# Patient Record
Sex: FEMALE | Race: WHITE | NOT HISPANIC OR LATINO | Employment: UNEMPLOYED | URBAN - METROPOLITAN AREA
[De-identification: names, ages, dates, MRNs, and addresses within clinical notes are randomized per-mention and may not be internally consistent; named-entity substitution may affect disease eponyms.]

---

## 2024-08-29 DIAGNOSIS — M77.42 METATARSALGIA OF LEFT FOOT: ICD-10-CM

## 2024-08-29 RX ORDER — MELOXICAM 15 MG/1
15 TABLET ORAL DAILY
Qty: 30 TABLET | Refills: 0 | Status: SHIPPED | OUTPATIENT
Start: 2024-08-29

## 2024-08-30 ENCOUNTER — TELEPHONE (OUTPATIENT)
Dept: OBGYN CLINIC | Facility: CLINIC | Age: 49
End: 2024-08-30

## 2024-08-30 ENCOUNTER — OFFICE VISIT (OUTPATIENT)
Age: 49
End: 2024-08-30
Payer: COMMERCIAL

## 2024-08-30 VITALS
HEART RATE: 62 BPM | BODY MASS INDEX: 28.34 KG/M2 | SYSTOLIC BLOOD PRESSURE: 158 MMHG | HEIGHT: 68 IN | DIASTOLIC BLOOD PRESSURE: 97 MMHG | WEIGHT: 187 LBS

## 2024-08-30 DIAGNOSIS — M77.42 METATARSALGIA, LEFT FOOT: ICD-10-CM

## 2024-08-30 DIAGNOSIS — M20.32 HALLUX HAMMERTOE, LEFT: Primary | ICD-10-CM

## 2024-08-30 DIAGNOSIS — M21.962 METATARSAL DEFORMITY, LEFT: ICD-10-CM

## 2024-08-30 DIAGNOSIS — M79.672 FOOT PAIN, LEFT: ICD-10-CM

## 2024-08-30 DIAGNOSIS — M20.42 HAMMERTOE OF LEFT FOOT: ICD-10-CM

## 2024-08-30 DIAGNOSIS — Z01.818 PREOPERATIVE CLEARANCE: ICD-10-CM

## 2024-08-30 PROCEDURE — 99213 OFFICE O/P EST LOW 20 MIN: CPT | Performed by: STUDENT IN AN ORGANIZED HEALTH CARE EDUCATION/TRAINING PROGRAM

## 2024-08-30 RX ORDER — CHLORHEXIDINE GLUCONATE ORAL RINSE 1.2 MG/ML
15 SOLUTION DENTAL ONCE
OUTPATIENT
Start: 2024-08-30 | End: 2024-08-30

## 2024-08-30 RX ORDER — CHLORHEXIDINE GLUCONATE 40 MG/ML
SOLUTION TOPICAL DAILY PRN
OUTPATIENT
Start: 2024-08-30

## 2024-09-03 NOTE — PROGRESS NOTES
[facility] Office Visit    ASSESSMENT     Diagnoses and all orders for this visit:    Hallux hammertoe, left  -     Post Op Shoe    Metatarsal deformity, left  -     Case request operating room: Weil osteotomy third metatarsal, REPAIR HAMMERTOE third digit; Standing  -     Ambulatory referral to Family Practice; Future  -     Cancel: Basic metabolic panel; Future  -     Comprehensive metabolic panel; Future  -     Case request operating room: Weil osteotomy third metatarsal, REPAIR HAMMERTOE third digit    Foot pain, left  -     Case request operating room: Weil osteotomy third metatarsal, REPAIR HAMMERTOE third digit; Standing  -     Case request operating room: Weil osteotomy third metatarsal, REPAIR HAMMERTOE third digit    Metatarsalgia, left foot  -     Case request operating room: Weil osteotomy third metatarsal, REPAIR HAMMERTOE third digit; Standing  -     Ambulatory referral to Family Practice; Future  -     Cancel: Basic metabolic panel; Future  -     Comprehensive metabolic panel; Future  -     Case request operating room: Weil osteotomy third metatarsal, REPAIR HAMMERTOE third digit    Hammertoe of left foot  -     Case request operating room: Weil osteotomy third metatarsal, REPAIR HAMMERTOE third digit; Standing  -     Ambulatory referral to Family Practice; Future  -     Cancel: Basic metabolic panel; Future  -     Comprehensive metabolic panel; Future  -     Case request operating room: Weil osteotomy third metatarsal, REPAIR HAMMERTOE third digit    Preoperative clearance  -     Ambulatory referral to Family Practice; Future  -     Cancel: Basic metabolic panel; Future  -     Comprehensive metabolic panel; Future  -     CBC and differential; Future    Other orders  -     Nursing Communication Warmimg Interventions Implemented; Standing  -     Nursing Communication CHG bath, have staff wash entire body (neck down) per pre-op bathing protocol. Routine, evening prior to,  and day of surgery.; Standing  -     Nursing Communication Swab both nares with Povidone-Iodine solution, EXCLUDE if patient has shellfish/Iodine allergy, and replace with nasal alcohol swabstick. Routine, day of surgery, on call to OR; Standing  -     chlorhexidine (PERIDEX) 0.12 % oral rinse 15 mL  -     Void on call to OR; Standing  -     Insert peripheral IV; Standing  -     Diet NPO; Sips with meds; Standing  -     ceFAZolin (ANCEF) 2,000 mg in sodium chloride 0.9 % 50 mL IVPB  -     chlorhexidine gluconate (HIBICLENS) 4 % topical liquid         Problem List Items Addressed This Visit          Musculoskeletal and Integument    Hallux hammertoe, left - Primary    Relevant Orders    Post Op Shoe     Other Visit Diagnoses       Metatarsal deformity, left        Relevant Orders    Case request operating room: Weil osteotomy third metatarsal, REPAIR HAMMERTOE third digit (Completed)    Ambulatory referral to [facility]    Comprehensive metabolic panel    Foot pain, left        Relevant Orders    Case request operating room: Weil osteotomy third metatarsal, REPAIR HAMMERTOE third digit (Completed)    Metatarsalgia, left foot        Relevant Orders    Case request operating room: Weil osteotomy third metatarsal, REPAIR HAMMERTOE third digit (Completed)    Ambulatory referral to [facility]    Comprehensive metabolic panel    Hammertoe of left foot        Relevant Orders    Case request operating room: Weil osteotomy third metatarsal, REPAIR HAMMERTOE third digit (Completed)    Ambulatory referral to [facility]    Comprehensive metabolic panel    Preoperative clearance        Relevant Orders    Ambulatory referral to [facility]    Comprehensive metabolic panel    CBC and differential          PLAN  -Patient was educated regarding their condition.  -Continue supportive shoes and meloxicam  -After a thorough discussion of continued conservative versus surgical management, Moody would like to pursue  surgical correction of her left foot.  We discussed all risks, possible complications, and alternatives.  -Follow-up PCP clearance, CBC, CMP    Written Consent statement:  I was very clear at the beginning of the discussion about alternatives to this surgery including benign neglect, bracing, and second surgical opinions.   I spent time to discuss with the patient the surgical procedure(s) as Weil osteotomy left third metatarsal, left third digit hammertoe repair, and post-op course required to properly heal the surgery. I discussed risks as infection, scar, swelling, chronic pain, painful or prominent hardware (if used), possible need to remove hardware (if used), poor healing of incision or bone that could require more surgery, incomplete correction of deformities or recurrence of deformities, change in shape of foot, toe, or walking and function, numbness, neuritis/RSD, blood clots in the leg or lung, and even death from anesthesia complications. No guarantees were given and the possibility of recurrent deformity or incomplete correction were discussed before patient signed the consent form. We also discussed the need for possible anticoagulation. The offloading device necessary after the surgery will be a surgical shoe.     SUBJECTIVE    Chief Complaint:  Left foot pain     Patient ID: Moody Garrido     8/2/2024: Moody is a pleasant 48-year-old female who presents today for evaluation of her left foot.  She states that she dropped a laptop on her toe sometime around 7/16/2024.  She states that there is bruising and swelling at the time, she was limping and was put in a boot.  She states that she stopped wearing the boot yesterday.  She states that there is continued pain to her left plantar foot at the ball of the foot.  She denies attempting any other treatment.  She states that there is pain with ambulation which is made better with rest.    8/30/2024: Moody states that despite getting better sneakers and  "using her metatarsal pads she has continued pain to the left foot.  She states that generally is the bottom of her foot hurting but the top of her third digit hurts as well.  She is interested in planning surgical intervention at today's visit.        The following portions of the patient's history were reviewed and updated as appropriate: allergies, current medications, past family history, past medical history, past social history, past surgical history and problem list.    Review of Systems   Constitutional: Negative.    HENT: Negative.     Respiratory: Negative.     Cardiovascular: Negative.    Gastrointestinal: Negative.    Musculoskeletal:  Positive for arthralgias.   Skin: Negative.    Neurological: Negative.          OBJECTIVE      /97   Pulse 62   Ht 5' 8\" (1.727 m)   Wt 84.8 kg (187 lb)   BMI 28.43 kg/m²        Physical Exam  Constitutional:       Appearance: Normal appearance.   HENT:      Head: Normocephalic and atraumatic.   Eyes:      General:         Right eye: No discharge.         Left eye: No discharge.   Cardiovascular:      Rate and Rhythm: Normal rate and regular rhythm.      Pulses:           Dorsalis pedis pulses are 2+ on the right side and 2+ on the left side.        Posterior tibial pulses are 2+ on the right side and 2+ on the left side.   Pulmonary:      Effort: Pulmonary effort is normal.      Breath sounds: Normal breath sounds.   Skin:     General: Skin is warm.      Capillary Refill: Capillary refill takes less than 2 seconds.   Neurological:      Sensory: Sensation is intact. No sensory deficit.         MSK:  -Pain on palpation to left plantar foot, submetatarsal 3  -Pain on palpation to the dorsal aspect of the third proximal interphalangeal joint  -Third digit is noted to be dorsiflexed at the level of the MTPJ, plantarflexed at the level of the PIPJ consistent with a hammertoe deformity.  -No gross deformities noted   -Active range of motion lesser digits intact  -First " MTPJ range of motion within normal limits bilaterally  -Ankle dorsiflexion at least 10 degrees with knee extended, and knee flexed  -Prominent metatarsal heads noted plantarly, especially the 3rd metatarsal    Derm:  -No lesions, abrasions, or open wounds noted  -No noted interdigital maceration, peeling, malodor  -No callus formation noted on exam     Vascular:  -DP and PT pulses intact b/l  -Capillary refill time <2 sec b/l  -Digital hair growth: Present  -Skin temp: WNL

## 2024-09-09 ENCOUNTER — OFFICE VISIT (OUTPATIENT)
Dept: FAMILY MEDICINE CLINIC | Facility: CLINIC | Age: 49
End: 2024-09-09
Payer: COMMERCIAL

## 2024-09-09 VITALS
WEIGHT: 189.4 LBS | TEMPERATURE: 97.5 F | SYSTOLIC BLOOD PRESSURE: 138 MMHG | HEART RATE: 74 BPM | DIASTOLIC BLOOD PRESSURE: 78 MMHG | BODY MASS INDEX: 28.7 KG/M2 | HEIGHT: 68 IN | OXYGEN SATURATION: 100 % | RESPIRATION RATE: 18 BRPM

## 2024-09-09 DIAGNOSIS — R53.83 OTHER FATIGUE: ICD-10-CM

## 2024-09-09 DIAGNOSIS — M48.07 SPINAL STENOSIS OF LUMBOSACRAL REGION: ICD-10-CM

## 2024-09-09 DIAGNOSIS — M46.1 SACROILIITIS, NOT ELSEWHERE CLASSIFIED (HCC): ICD-10-CM

## 2024-09-09 DIAGNOSIS — M96.1 FAILED BACK SYNDROME: ICD-10-CM

## 2024-09-09 DIAGNOSIS — Z76.89 ENCOUNTER TO ESTABLISH CARE: ICD-10-CM

## 2024-09-09 DIAGNOSIS — M77.42 METATARSALGIA, LEFT FOOT: ICD-10-CM

## 2024-09-09 DIAGNOSIS — M79.7 FIBROMYALGIA: Primary | ICD-10-CM

## 2024-09-09 DIAGNOSIS — E66.3 OVERWEIGHT (BMI 25.0-29.9): ICD-10-CM

## 2024-09-09 DIAGNOSIS — Z13.29 SCREENING FOR THYROID DISORDER: ICD-10-CM

## 2024-09-09 DIAGNOSIS — M21.962 METATARSAL DEFORMITY, LEFT: ICD-10-CM

## 2024-09-09 DIAGNOSIS — K59.02 DYSSYNERGIC DEFECATION: ICD-10-CM

## 2024-09-09 DIAGNOSIS — M20.42 HAMMERTOE OF LEFT FOOT: ICD-10-CM

## 2024-09-09 DIAGNOSIS — Z13.1 ENCOUNTER FOR SCREENING FOR DIABETES MELLITUS: ICD-10-CM

## 2024-09-09 DIAGNOSIS — Z13.228 SCREENING FOR METABOLIC DISORDER: ICD-10-CM

## 2024-09-09 DIAGNOSIS — Z13.220 SCREENING FOR LIPID DISORDERS: ICD-10-CM

## 2024-09-09 PROCEDURE — 99204 OFFICE O/P NEW MOD 45 MIN: CPT | Performed by: STUDENT IN AN ORGANIZED HEALTH CARE EDUCATION/TRAINING PROGRAM

## 2024-09-09 NOTE — PROGRESS NOTES
Ambulatory Visit  Name: Moody Garrido      : 1975      MRN: [id number]  Encounter Provider: Katarina Gonsalez MD  Encounter Date: 2024   Encounter department: [facility]    Assessment & Plan   1. Fibromyalgia  -     Ambulatory referral to Spine & Pain Management; Future  2. Metatarsal deformity, left  -     Ambulatory referral to Family Practice  3. Metatarsalgia, left foot  -     Ambulatory referral to Family Practice  4. Hammertoe of left foot  -     Ambulatory referral to Family Practice  5. Failed back syndrome  -     Ambulatory referral to Spine & Pain Management; Future  6. Spinal stenosis of lumbosacral region  -     Ambulatory referral to Spine & Pain Management; Future  7. Sacroiliitis, not elsewhere classified (HCC)  8. Dyssynergic defecation  -     Ambulatory Referral to Physical Therapy; Future  9. Screening for thyroid disorder  -     TSH, 3rd generation with Free T4 reflex; Future  10. Overweight (BMI 25.0-29.9)  -     Lipid Panel with Direct LDL reflex; Future; Expected date: 2024  -     Hemoglobin A1C; Future  -     Comprehensive metabolic panel; Future  11. Screening for lipid disorders  -     Lipid Panel with Direct LDL reflex; Future; Expected date: 2024  12. Screening for metabolic disorder  -     Hemoglobin A1C; Future  13. Other fatigue  -     CBC and differential; Future  -     Comprehensive metabolic panel; Future  14. Encounter for screening for diabetes mellitus  -     Hemoglobin A1C; Future  15. Encounter to establish care       History of Present Illness     HPI    Patient presents to establish care  Patient dropped a laptop on her left foot about two weeks ago. She saw podiatry and is scheduled for surgery of metatarsal deformity in two weeks.   She has a history of multiple back pain issues along with fibromyalgia. She saw pain management for this in [place]. She notes because she was dealing with her divorce she abruptly stopped  "all pain medications on her own. She was on opiates in the past.   She moved from [place]. Smokes medical marijuana.   Drinks socially  Constipation from dyssynergic syndrome diagnosed 4-5 years ago  No children or pregnancies  Both parents were diabetic  Paternal aunt breast cancer  There is colon and pancreatic cancer on father's side    Review of Systems   Constitutional:  Negative for activity change, chills, diaphoresis, fatigue and fever.   HENT:  Negative for congestion, postnasal drip, rhinorrhea and sore throat.    Respiratory:  Negative for cough, shortness of breath and wheezing.    Cardiovascular:  Negative for chest pain, palpitations and leg swelling.   Gastrointestinal:  Positive for constipation. Negative for abdominal pain, diarrhea, nausea and vomiting.   Musculoskeletal:  Positive for arthralgias, back pain and myalgias.   Skin:  Negative for rash.   Neurological:  Negative for weakness, light-headedness and headaches.   Psychiatric/Behavioral:  The patient is not nervous/anxious.        Objective     /78   Pulse 74   Temp 97.5 °F (36.4 °C) (Temporal)   Resp 18   Ht 5' 8\" (1.727 m)   Wt 85.9 kg (189 lb 6.4 oz)   LMP 08/15/2023 (Approximate) Comment: post menapausal  SpO2 100%   BMI 28.80 kg/m²     Physical Exam  Constitutional:       Appearance: Normal appearance.   HENT:      Head: Normocephalic and atraumatic.   Cardiovascular:      Rate and Rhythm: Normal rate and regular rhythm.      Pulses: Normal pulses.      Heart sounds: Normal heart sounds.   Neurological:      General: No focal deficit present.      Mental Status: She is alert and oriented to person, place, and time.   Psychiatric:         Mood and Affect: Mood normal.         Behavior: Behavior normal.         Thought Content: Thought content normal.         Judgment: Judgment normal.       Administrative Statements           "

## 2024-09-10 ENCOUNTER — APPOINTMENT (OUTPATIENT)
Dept: LAB | Facility: CLINIC | Age: 49
End: 2024-09-10
Payer: COMMERCIAL

## 2024-09-10 DIAGNOSIS — E66.3 OVERWEIGHT (BMI 25.0-29.9): ICD-10-CM

## 2024-09-10 DIAGNOSIS — R53.83 OTHER FATIGUE: ICD-10-CM

## 2024-09-10 DIAGNOSIS — M20.42 HAMMERTOE OF LEFT FOOT: ICD-10-CM

## 2024-09-10 DIAGNOSIS — M77.42 METATARSALGIA, LEFT FOOT: ICD-10-CM

## 2024-09-10 DIAGNOSIS — Z13.1 ENCOUNTER FOR SCREENING FOR DIABETES MELLITUS: ICD-10-CM

## 2024-09-10 DIAGNOSIS — Z13.228 SCREENING FOR METABOLIC DISORDER: ICD-10-CM

## 2024-09-10 DIAGNOSIS — M21.962 METATARSAL DEFORMITY, LEFT: ICD-10-CM

## 2024-09-10 DIAGNOSIS — Z13.29 SCREENING FOR THYROID DISORDER: ICD-10-CM

## 2024-09-10 DIAGNOSIS — Z01.818 PREOPERATIVE CLEARANCE: ICD-10-CM

## 2024-09-10 DIAGNOSIS — Z13.220 SCREENING FOR LIPID DISORDERS: ICD-10-CM

## 2024-09-10 LAB
ALBUMIN SERPL BCG-MCNC: 4.5 G/DL (ref 3.5–5)
ALP SERPL-CCNC: 45 U/L (ref 34–104)
ALT SERPL W P-5'-P-CCNC: 33 U/L (ref 7–52)
ANION GAP SERPL CALCULATED.3IONS-SCNC: 12 MMOL/L (ref 4–13)
AST SERPL W P-5'-P-CCNC: 33 U/L (ref 13–39)
BASOPHILS # BLD AUTO: 0.03 THOUSANDS/ΜL (ref 0–0.1)
BASOPHILS NFR BLD AUTO: 1 % (ref 0–1)
BILIRUB SERPL-MCNC: 1.09 MG/DL (ref 0.2–1)
BUN SERPL-MCNC: 16 MG/DL (ref 5–25)
CALCIUM SERPL-MCNC: 9.5 MG/DL (ref 8.4–10.2)
CHLORIDE SERPL-SCNC: 100 MMOL/L (ref 96–108)
CHOLEST SERPL-MCNC: 255 MG/DL
CO2 SERPL-SCNC: 28 MMOL/L (ref 21–32)
CREAT SERPL-MCNC: 0.59 MG/DL (ref 0.6–1.3)
EOSINOPHIL # BLD AUTO: 0.07 THOUSAND/ΜL (ref 0–0.61)
EOSINOPHIL NFR BLD AUTO: 1 % (ref 0–6)
ERYTHROCYTE [DISTWIDTH] IN BLOOD BY AUTOMATED COUNT: 12.6 % (ref 11.6–15.1)
GFR SERPL CREATININE-BSD FRML MDRD: 107 ML/MIN/1.73SQ M
GLUCOSE P FAST SERPL-MCNC: 86 MG/DL (ref 65–99)
HCT VFR BLD AUTO: 41.5 % (ref 34.8–46.1)
HDLC SERPL-MCNC: 117 MG/DL
HGB BLD-MCNC: 13.5 G/DL (ref 11.5–15.4)
IMM GRANULOCYTES # BLD AUTO: 0.01 THOUSAND/UL (ref 0–0.2)
IMM GRANULOCYTES NFR BLD AUTO: 0 % (ref 0–2)
LDLC SERPL CALC-MCNC: 128 MG/DL (ref 0–100)
LYMPHOCYTES # BLD AUTO: 1.67 THOUSANDS/ΜL (ref 0.6–4.47)
LYMPHOCYTES NFR BLD AUTO: 34 % (ref 14–44)
MCH RBC QN AUTO: 32.3 PG (ref 26.8–34.3)
MCHC RBC AUTO-ENTMCNC: 32.5 G/DL (ref 31.4–37.4)
MCV RBC AUTO: 99 FL (ref 82–98)
MONOCYTES # BLD AUTO: 0.71 THOUSAND/ΜL (ref 0.17–1.22)
MONOCYTES NFR BLD AUTO: 14 % (ref 4–12)
NEUTROPHILS # BLD AUTO: 2.49 THOUSANDS/ΜL (ref 1.85–7.62)
NEUTS SEG NFR BLD AUTO: 50 % (ref 43–75)
NRBC BLD AUTO-RTO: 0 /100 WBCS
PLATELET # BLD AUTO: 266 THOUSANDS/UL (ref 149–390)
PMV BLD AUTO: 9.5 FL (ref 8.9–12.7)
POTASSIUM SERPL-SCNC: 3.9 MMOL/L (ref 3.5–5.3)
PROT SERPL-MCNC: 7.4 G/DL (ref 6.4–8.4)
RBC # BLD AUTO: 4.18 MILLION/UL (ref 3.81–5.12)
SODIUM SERPL-SCNC: 140 MMOL/L (ref 135–147)
TRIGL SERPL-MCNC: 50 MG/DL
TSH SERPL DL<=0.05 MIU/L-ACNC: 3.39 UIU/ML (ref 0.45–4.5)
WBC # BLD AUTO: 4.98 THOUSAND/UL (ref 4.31–10.16)

## 2024-09-10 PROCEDURE — 85025 COMPLETE CBC W/AUTO DIFF WBC: CPT

## 2024-09-10 PROCEDURE — 36415 COLL VENOUS BLD VENIPUNCTURE: CPT

## 2024-09-10 PROCEDURE — 84443 ASSAY THYROID STIM HORMONE: CPT

## 2024-09-10 PROCEDURE — 83036 HEMOGLOBIN GLYCOSYLATED A1C: CPT

## 2024-09-10 PROCEDURE — 80053 COMPREHEN METABOLIC PANEL: CPT

## 2024-09-10 PROCEDURE — 80061 LIPID PANEL: CPT

## 2024-09-11 ENCOUNTER — TELEPHONE (OUTPATIENT)
Dept: ADMINISTRATIVE | Facility: OTHER | Age: 49
End: 2024-09-11

## 2024-09-11 LAB
EST. AVERAGE GLUCOSE BLD GHB EST-MCNC: 103 MG/DL
HBA1C MFR BLD: 5.2 %

## 2024-09-11 NOTE — LETTER
Procedure Request Form: Cervical Cancer Screening      Date Requested: 24  Patient: Moody Garrido  Patient : 1975   Referring Provider: Katarina Gonsalez MD        Date of Procedure ______________________________       The above patient has informed us that they have completed their   most recent Cervical Cancer Screening at your facility. Please complete   this form and attach all corresponding procedure reports/results.    Comments __________________________________________________________  ____________________________________________________________________  ____________________________________________________________________  ____________________________________________________________________    Facility Completing Procedure _________________________________________    Form Completed By (print name) _______________________________________      Signature __________________________________________________________      These reports are needed for  compliance.    Please fax this completed form and a copy of the procedure report to our office located at [address] [place] [address] as soon as possible to Fax [phone] yeni Miller: Phone [phone]    We thank you for your assistance in treating our mutual patient.

## 2024-09-11 NOTE — LETTER
Procedure Request Form: Mammogram      Date Requested: 10/09/24  Patient: Moody Garrido  Patient : 1975   Referring Provider: Katarina Gonsalez MD        Date of Procedure ______________________________       The above patient has informed us that they have completed their   most recent Mammogram at your facility. Please complete   this form and attach all corresponding procedure reports/results.    Comments __________________________________________________________  ____________________________________________________________________  ____________________________________________________________________  ____________________________________________________________________    Facility Completing Procedure _________________________________________    Form Completed By (print name) _______________________________________      Signature __________________________________________________________      These reports are needed for  compliance.    Please fax this completed form and a copy of the procedure report to our office located at [address] [place] [address] as soon as possible to Fax [phone] attention Paul: Phone [phone]    We thank you for your assistance in treating our mutual patient.

## 2024-09-11 NOTE — PRE-PROCEDURE INSTRUCTIONS
Pre-Surgery Instructions:   Medication Instructions    meloxicam (MOBIC) 15 mg tablet Stop taking 7 days prior to surgery.    omeprazole (PriLOSEC) 40 MG capsule Take day of surgery.    sucralfate (CARAFATE) 1 g tablet Hold day of surgery.    Medication instructions for day surgery reviewed. Please use only a sip of water to take your instructed medications. Avoid all over the counter vitamins, supplements and NSAIDS for one week prior to surgery per anesthesia guidelines. Tylenol is ok to take as needed.     You will receive a call one business day prior to surgery with an arrival time and hospital directions. If your surgery is scheduled on a Monday, the hospital will be calling you on the Friday prior to your surgery. If you have not heard from anyone by 8pm, please call the hospital supervisor through the hospital  at [phone]. ([place] [phone] or [place] [phone]).    Do not eat or drink anything after midnight the night before your surgery, including candy, mints, lifesavers, or chewing gum. Do not drink alcohol 24hrs before your surgery. Try not to smoke at least 24hrs before your surgery.       Follow the pre surgery showering instructions as listed in the “My Surgical Experience Booklet” or otherwise provided by your surgeon's office. Do not use a blade to shave the surgical area 1 week before surgery. It is okay to use a clean electric clippers up to 24 hours before surgery. Do not apply any lotions, creams, including makeup, cologne, deodorant, or perfumes after showering on the day of your surgery. Do not use dry shampoo, hair spray, hair gel, or any type of hair products.     No contact lenses, eye make-up, or artificial eyelashes. Remove nail polish, including gel polish, and any artificial, gel, or acrylic nails if possible. Remove all jewelry including rings and body piercing jewelry.     Wear causal clothing that is easy to take on and off. Consider your type of  surgery.    Keep any valuables, jewelry, piercings at home. Please bring any specially ordered equipment (sling, braces) if indicated.    Arrange for a responsible person to drive you to and from the hospital on the day of your surgery. Please confirm the visitor policy for the day of your procedure when you receive your phone call with an arrival time.     Call the surgeon's office with any new illnesses, exposures, or additional questions prior to surgery.    Please reference your “My Surgical Experience Booklet” for additional information to prepare for your upcoming surgery.

## 2024-09-11 NOTE — LETTER
Procedure Request Form: Mammogram      Date Requested: 24  Patient: Moody Garrido  Patient : 1975   Referring Provider: Katarina Gonsalez MD        Date of Procedure ______________________________       The above patient has informed us that they have completed their   most recent Mammogram at your facility. Please complete   this form and attach all corresponding procedure reports/results.    Comments __________________________________________________________  ____________________________________________________________________  ____________________________________________________________________  ____________________________________________________________________    Facility Completing Procedure _________________________________________    Form Completed By (print name) _______________________________________      Signature __________________________________________________________      These reports are needed for  compliance.    Please fax this completed form and a copy of the procedure report to our office located at [address] [place] [address] as soon as possible to Fax [phone] attention Paul: Phone [phone]    We thank you for your assistance in treating our mutual patient.

## 2024-09-11 NOTE — TELEPHONE ENCOUNTER
----- Message from Sofia GONZALES sent at 9/11/2024 10:49 AM EDT -----  Regarding: Care Gap Requst  09/11/24 10:49 AM    Hello, our patient attached above has had Mammogram and Pap Smear (HPV) aka Cervical Cancer Screening completed/performed. Please assist in updating the patient chart by making an External outreach to [facility] located in [facility]. DR. Raymundo (P)[phone]    Thank you,  Sofia Che  [facility]

## 2024-09-11 NOTE — TELEPHONE ENCOUNTER
Upon review of the In Basket request and the patient's chart, initial outreach has been made via fax to facility. Please see Contacts section for details.     Thank you  Paul Peterson MA

## 2024-09-11 NOTE — LETTER
Procedure Request Form: Cervical Cancer Screening      Date Requested: 10/09/24  Patient: Moody Grarido  Patient : 1975   Referring Provider: Katarina Gonsalez MD        Date of Procedure ______________________________       The above patient has informed us that they have completed their   most recent Cervical Cancer Screening at your facility. Please complete   this form and attach all corresponding procedure reports/results.    Comments __________________________________________________________  ____________________________________________________________________  ____________________________________________________________________  ____________________________________________________________________    Facility Completing Procedure _________________________________________    Form Completed By (print name) _______________________________________      Signature __________________________________________________________      These reports are needed for  compliance.    Please fax this completed form and a copy of the procedure report to our office located at [address] [place] [address] as soon as possible to Fax [phone] yeni Miller: Phone [phone]    We thank you for your assistance in treating our mutual patient.

## 2024-09-18 ENCOUNTER — ANESTHESIA EVENT (OUTPATIENT)
Dept: PERIOP | Facility: HOSPITAL | Age: 49
End: 2024-09-18
Payer: COMMERCIAL

## 2024-09-18 ENCOUNTER — CONSULT (OUTPATIENT)
Dept: FAMILY MEDICINE CLINIC | Facility: CLINIC | Age: 49
End: 2024-09-18
Payer: COMMERCIAL

## 2024-09-18 VITALS
WEIGHT: 188 LBS | HEIGHT: 68 IN | SYSTOLIC BLOOD PRESSURE: 144 MMHG | DIASTOLIC BLOOD PRESSURE: 70 MMHG | HEART RATE: 78 BPM | RESPIRATION RATE: 16 BRPM | BODY MASS INDEX: 28.49 KG/M2 | TEMPERATURE: 98 F | OXYGEN SATURATION: 99 %

## 2024-09-18 DIAGNOSIS — R03.0 ELEVATED BLOOD PRESSURE READING: ICD-10-CM

## 2024-09-18 DIAGNOSIS — Z01.818 PRE-OP EXAMINATION: ICD-10-CM

## 2024-09-18 DIAGNOSIS — R63.5 WEIGHT GAIN: ICD-10-CM

## 2024-09-18 DIAGNOSIS — K21.9 GASTROESOPHAGEAL REFLUX DISEASE WITHOUT ESOPHAGITIS: ICD-10-CM

## 2024-09-18 DIAGNOSIS — M20.32 HALLUX HAMMERTOE, LEFT: Primary | ICD-10-CM

## 2024-09-18 DIAGNOSIS — L65.9 HAIR LOSS: ICD-10-CM

## 2024-09-18 DIAGNOSIS — R79.9 ABNORMAL FINDING OF BLOOD CHEMISTRY, UNSPECIFIED: ICD-10-CM

## 2024-09-18 PROBLEM — M46.1 SACROILIITIS, NOT ELSEWHERE CLASSIFIED (HCC): Chronic | Status: ACTIVE | Noted: 2018-09-27

## 2024-09-18 PROBLEM — M51.369 OTHER INTERVERTEBRAL DISC DEGENERATION, LUMBAR REGION: Status: ACTIVE | Noted: 2018-09-27

## 2024-09-18 PROBLEM — M54.16 RADICULOPATHY, LUMBAR REGION: Status: ACTIVE | Noted: 2018-09-27

## 2024-09-18 PROBLEM — M51.86 OTHER INTERVERTEBRAL DISC DISORDERS, LUMBAR REGION: Status: ACTIVE | Noted: 2018-09-27

## 2024-09-18 PROBLEM — M51.36 OTHER INTERVERTEBRAL DISC DEGENERATION, LUMBAR REGION: Status: ACTIVE | Noted: 2018-09-27

## 2024-09-18 PROBLEM — M54.2 CERVICALGIA: Status: ACTIVE | Noted: 2018-09-27

## 2024-09-18 PROBLEM — M47.817 SPONDYLOSIS WITHOUT MYELOPATHY OR RADICULOPATHY, LUMBOSACRAL REGION: Status: ACTIVE | Noted: 2018-09-27

## 2024-09-18 PROBLEM — M96.1 POSTLAMINECTOMY SYNDROME: Status: ACTIVE | Noted: 2018-09-27

## 2024-09-18 PROBLEM — R19.8 RECTAL PRESSURE: Status: ACTIVE | Noted: 2020-10-14

## 2024-09-18 PROCEDURE — G2211 COMPLEX E/M VISIT ADD ON: HCPCS | Performed by: STUDENT IN AN ORGANIZED HEALTH CARE EDUCATION/TRAINING PROGRAM

## 2024-09-18 PROCEDURE — 99213 OFFICE O/P EST LOW 20 MIN: CPT | Performed by: STUDENT IN AN ORGANIZED HEALTH CARE EDUCATION/TRAINING PROGRAM

## 2024-09-18 NOTE — PROGRESS NOTES
[facility] PRACTICE PRE-OPERATIVE EVALUATION  [facility] PHYSICIAN GROUP [facility] PHYSICIANS    NAME: Moody Garrido  AGE: 48 y.o. SEX: female  : 1975     DATE: 2024    [facility] Pre-Operative Evaluation      Chief Complaint: Pre-operative Evaluation     Surgery: Weil osteotomy third metatarsal (Left:toe) and repair hammertoe third digit (left:foot)  Anticipated Date of Surgery: 24  Referring Provider: No ref. provider found       History of Present Illness:     Moody Garrido is a 48 y.o. female who presents to the office today for a preoperative consultation at the request of surgeon, Dr. Ramsey, on 24. Planned anesthesia is general. Patient has a bleeding risk of: no recent abnormal bleeding. Patient does not have objections to receiving blood products if needed.    Assessment of Chronic Conditions:   - Fibromyalgia   -GERD  -Spinal stenosis of lumbosacral region  -Failed back syndrome    Assessment of Cardiac Risk:  Denies unstable or severe angina or MI in the last 6 weeks or history of stent placement in the last year   Denies decompensated heart failure (e.g. New onset heart failure, NYHA functional class IV heart failure, or worsening existing heart failure)  Denies significant arrhythmias such as high grade AV block, symptomatic ventricular arrhythmia, newly recognized ventricular tachycardia, supraventricular tachycardia with resting heart rate >100, or symptomatic bradycardia  Denies severe heart valve disease including aortic stenosis or symptomatic mitral stenosis     Exercise Capacity:  Able to walk 4 blocks without symptoms?: Yes  Able to walk 2 flights without symptoms?: Yes    Prior Anesthesia Reactions: No     Personal history of venous thromboembolic disease? No    History of steroid use for >2 weeks within last year? No         Review of Systems:     Review of Systems   Constitutional:  Negative for activity change, chills, diaphoresis, fatigue and  fever.   HENT:  Negative for congestion, postnasal drip, rhinorrhea and sore throat.    Respiratory:  Negative for cough, shortness of breath and wheezing.    Cardiovascular:  Negative for chest pain, palpitations and leg swelling.   Gastrointestinal:  Negative for abdominal pain, constipation, diarrhea, nausea and vomiting.   Musculoskeletal:  Positive for arthralgias, back pain and myalgias.   Skin:  Negative for rash.   Neurological:  Negative for weakness, light-headedness and headaches.   Psychiatric/Behavioral:  The patient is not nervous/anxious.        Current Problem List:     Patient Active Problem List   Diagnosis    Hallux swatie, left    Failed back syndrome    Fibromyalgia    Spinal stenosis of lumbosacral region    Dyssynergic defecation       Allergies:     No Known Allergies    Current Medications:       Current Outpatient Medications:     meloxicam (MOBIC) 15 mg tablet, TAKE 1 TABLET(15 MG) BY MOUTH DAILY, Disp: 30 tablet, Rfl: 0    omeprazole (PriLOSEC) 40 MG capsule, Take 40 mg by mouth daily, Disp: , Rfl:     sucralfate (CARAFATE) 1 g tablet, Take 1 g by mouth 4 (four) times a day, Disp: , Rfl:     Past Medical History:       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    Vaginosis         Past Surgical History:   Procedure Laterality Date    ABDOMINAL SURGERY      second surgery for endometriosis    BACK SURGERY      CHOLECYSTECTOMY      LAPAROSCOPIC ENDOMETRIOSIS FULGURATION          Family History   Problem Relation Age of Onset    Heart disease Mother     Diabetes Mother     Heart disease Father     Diabetes Father         Social History     Socioeconomic History    Marital status: Single      Spouse name: Not on file    Number of children: Not on file    Years of education: Not on file    Highest education level: Not on file   Occupational History    Not on file   Tobacco Use    Smoking status: Never     Passive exposure: Past    Smokeless tobacco: Never   Vaping Use    Vaping status: Never Used   Substance and Sexual Activity    Alcohol use: Yes     Comment: socially    Drug use: Yes     Types: Marijuana     Comment: occ    Sexual activity: Not on file   Other Topics Concern    Not on file   Social History Narrative    Not on file     Social Determinants of Health     Financial Resource Strain: Patient Declined (2/27/2024)    Received from [facility]    Overall Financial Resource Strain (CARDIA)     Difficulty of Paying Living Expenses: Patient declined   Food Insecurity: Unknown (2/27/2024)    Received from [facility]    Hunger Vital Sign     Worried About Running Out of Food in the Last Year: Patient declined     Ran Out of Food in the Last Year: Not on file   Transportation Needs: Unknown (2/27/2024)    Received from [facility]    PRAPARE - Transportation     Lack of Transportation (Medical): Patient declined     Lack of Transportation (Non-Medical): Not on file   Physical Activity: Not on file   Stress: Not on file   Social Connections: Unknown (2/27/2024)    Received from [facility]    Social Connection and Isolation Panel [NHANES]     Frequency of Communication with Friends and Family: Patient declined     Frequency of Social Gatherings with Friends and Family: Not on file     Attends Jain Services: Not on file     Active Member of Clubs or Organizations: Not on file     Attends Club or Organization Meetings: Not on file     Marital Status: Not on file   Intimate Partner Violence: Unknown (2/27/2024)    Received from [facility]    Humiliation, Afraid, Rape, and Kick questionnaire     Fear of Current or Ex-Partner: Patient declined     Emotionally Abused: Not on  "file     Physically Abused: Not on file     Sexually Abused: Not on file   Housing Stability: Not on file        Physical Exam:     /70   Pulse 78   Temp 98 °F (36.7 °C) (Temporal)   Resp 16   Ht 5' 8\" (1.727 m)   Wt 85.3 kg (188 lb)   LMP 08/15/2023 (Approximate) Comment: post menapausal  SpO2 99%   BMI 28.59 kg/m²     Physical Exam  Constitutional:       Appearance: Normal appearance.   HENT:      Head: Normocephalic and atraumatic.   Cardiovascular:      Rate and Rhythm: Normal rate and regular rhythm.      Pulses: Normal pulses.      Heart sounds: Normal heart sounds.   Pulmonary:      Effort: Pulmonary effort is normal.      Breath sounds: Normal breath sounds.   Musculoskeletal:      Right foot: Normal.      Left foot: Decreased range of motion. Swelling and tenderness present.      Comments: Decreased ROM and tenderness noted in third digit of left toe   Neurological:      General: No focal deficit present.      Mental Status: She is alert and oriented to person, place, and time.   Psychiatric:         Mood and Affect: Mood normal.         Behavior: Behavior normal.         Thought Content: Thought content normal.         Judgment: Judgment normal.          Data:     Pre-operative work-up    Laboratory Results: I have personally reviewed the pertinent laboratory results/reports              Assessment & Recommendations:     1. Hallux hammertoe, left        2. Gastroesophageal reflux disease without esophagitis  Lipid Panel with Direct LDL reflex    Comprehensive metabolic panel      3. Weight gain  TSH, 3rd generation with Free T4 reflex    Lipid Panel with Direct LDL reflex    Comprehensive metabolic panel      4. Hair loss  TSH, 3rd generation with Free T4 reflex    Lipid Panel with Direct LDL reflex    Comprehensive metabolic panel      5. Abnormal finding of blood chemistry, unspecified  Lipid Panel with Direct LDL reflex      6. Pre-op examination        7. Elevated blood pressure reading   "          1. Hallux hammertoe, left      2. Gastroesophageal reflux disease without esophagitis    - Lipid Panel with Direct LDL reflex; Future  - Comprehensive metabolic panel; Future    3. Weight gain    - TSH, 3rd generation with Free T4 reflex; Future  - Lipid Panel with Direct LDL reflex; Future  - Comprehensive metabolic panel; Future    4. Hair loss    - TSH, 3rd generation with Free T4 reflex; Future  - Lipid Panel with Direct LDL reflex; Future  - Comprehensive metabolic panel; Future    5. Abnormal finding of blood chemistry, unspecified    - Lipid Panel with Direct LDL reflex; Future    6. Pre-op examination      7. Elevated blood pressure reading  BP-144/70  Elevated most likely secondary to pain  Monitor BP at home  Denies chest pain, SOB, dizziness and headache      Pre-Op Evaluation Assessment  48 y.o. female with planned surgery: left hallux hammertoe.    Known risk factors for perioperative complications: None.        Current medications which may produce withdrawal symptoms if withheld perioperatively: .    Pre-Op Evaluation Plan  1. Further preoperative workup as follows:   - None; no further preoperative work-up is required    2. Medication Management/Recommendations:   - Patient has been instructed to avoid aspirin containing medications or non-steroidal anti-inflammatory drugs for the week preceding surgery.    3. Prophylaxis for cardiac events with perioperative beta-blockers: not indicated.    4. Patient requires further consultation with: None    Clearance  Patient medically optimized for surgical procedure     Katarina Gonsalez MD  [address] [address]  Phone#  [phone]  Fax#  [phone]

## 2024-09-20 NOTE — TELEPHONE ENCOUNTER
As a follow-up, a second attempt has been made for outreach via fax to facility. Please see Contacts section for details.    Thank you  Paul Peterson MA

## 2024-09-24 ENCOUNTER — HOSPITAL ENCOUNTER (OUTPATIENT)
Facility: HOSPITAL | Age: 49
Setting detail: OUTPATIENT SURGERY
Discharge: HOME | End: 2024-09-24
Attending: STUDENT IN AN ORGANIZED HEALTH CARE EDUCATION/TRAINING PROGRAM | Admitting: STUDENT IN AN ORGANIZED HEALTH CARE EDUCATION/TRAINING PROGRAM
Payer: COMMERCIAL

## 2024-09-24 ENCOUNTER — ANESTHESIA (OUTPATIENT)
Dept: PERIOP | Facility: HOSPITAL | Age: 49
End: 2024-09-24
Payer: COMMERCIAL

## 2024-09-24 ENCOUNTER — APPOINTMENT (OUTPATIENT)
Dept: RADIOLOGY | Facility: HOSPITAL | Age: 49
End: 2024-09-24
Payer: COMMERCIAL

## 2024-09-24 VITALS
BODY MASS INDEX: 28.8 KG/M2 | WEIGHT: 190.04 LBS | SYSTOLIC BLOOD PRESSURE: 133 MMHG | TEMPERATURE: 96.9 F | RESPIRATION RATE: 20 BRPM | HEART RATE: 55 BPM | DIASTOLIC BLOOD PRESSURE: 67 MMHG | OXYGEN SATURATION: 97 % | HEIGHT: 68 IN

## 2024-09-24 DIAGNOSIS — M79.672 LEFT FOOT PAIN: Primary | ICD-10-CM

## 2024-09-24 DIAGNOSIS — G89.18 POST-OPERATIVE PAIN: ICD-10-CM

## 2024-09-24 PROCEDURE — 73620 X-RAY EXAM OF FOOT: CPT

## 2024-09-24 PROCEDURE — 73630 X-RAY EXAM OF FOOT: CPT

## 2024-09-24 PROCEDURE — 28308 INCISION OF METATARSAL: CPT | Performed by: STUDENT IN AN ORGANIZED HEALTH CARE EDUCATION/TRAINING PROGRAM

## 2024-09-24 PROCEDURE — C1713 ANCHOR/SCREW BN/BN,TIS/BN: HCPCS | Performed by: STUDENT IN AN ORGANIZED HEALTH CARE EDUCATION/TRAINING PROGRAM

## 2024-09-24 PROCEDURE — 99024 POSTOP FOLLOW-UP VISIT: CPT | Performed by: STUDENT IN AN ORGANIZED HEALTH CARE EDUCATION/TRAINING PROGRAM

## 2024-09-24 PROCEDURE — NC001 PR NO CHARGE: Performed by: STUDENT IN AN ORGANIZED HEALTH CARE EDUCATION/TRAINING PROGRAM

## 2024-09-24 PROCEDURE — 28285 REPAIR OF HAMMERTOE: CPT | Performed by: STUDENT IN AN ORGANIZED HEALTH CARE EDUCATION/TRAINING PROGRAM

## 2024-09-24 DEVICE — K-WIRE TROCAR POINT BOTH ENDS .028                                    X 4: Type: IMPLANTABLE DEVICE | Site: FOOT | Status: FUNCTIONAL

## 2024-09-24 DEVICE — SCREW CORT 2 X 12MM QUICKFIX SML JOINT: Type: IMPLANTABLE DEVICE | Site: FOOT | Status: FUNCTIONAL

## 2024-09-24 RX ORDER — FENTANYL CITRATE 50 UG/ML
INJECTION, SOLUTION INTRAMUSCULAR; INTRAVENOUS AS NEEDED
Status: DISCONTINUED | OUTPATIENT
Start: 2024-09-24 | End: 2024-09-24

## 2024-09-24 RX ORDER — ACETAMINOPHEN 325 MG/1
650 TABLET ORAL EVERY 4 HOURS PRN
Status: DISCONTINUED | OUTPATIENT
Start: 2024-09-24 | End: 2024-09-24 | Stop reason: HOSPADM

## 2024-09-24 RX ORDER — MIDAZOLAM HYDROCHLORIDE 2 MG/2ML
INJECTION, SOLUTION INTRAMUSCULAR; INTRAVENOUS AS NEEDED
Status: DISCONTINUED | OUTPATIENT
Start: 2024-09-24 | End: 2024-09-24

## 2024-09-24 RX ORDER — PHENAZOPYRIDINE HYDROCHLORIDE 95 MG/1
190 TABLET ORAL 3 TIMES DAILY PRN
COMMUNITY

## 2024-09-24 RX ORDER — KETOROLAC TROMETHAMINE 30 MG/ML
INJECTION, SOLUTION INTRAMUSCULAR; INTRAVENOUS AS NEEDED
Status: DISCONTINUED | OUTPATIENT
Start: 2024-09-24 | End: 2024-09-24

## 2024-09-24 RX ORDER — PROMETHAZINE HYDROCHLORIDE 25 MG/ML
12.5 INJECTION, SOLUTION INTRAMUSCULAR; INTRAVENOUS ONCE AS NEEDED
Status: DISCONTINUED | OUTPATIENT
Start: 2024-09-24 | End: 2024-09-24 | Stop reason: HOSPADM

## 2024-09-24 RX ORDER — PROPOFOL 10 MG/ML
INJECTION, EMULSION INTRAVENOUS AS NEEDED
Status: DISCONTINUED | OUTPATIENT
Start: 2024-09-24 | End: 2024-09-24

## 2024-09-24 RX ORDER — PROPOFOL 10 MG/ML
INJECTION, EMULSION INTRAVENOUS CONTINUOUS PRN
Status: DISCONTINUED | OUTPATIENT
Start: 2024-09-24 | End: 2024-09-24

## 2024-09-24 RX ORDER — HYDROMORPHONE HCL/PF 1 MG/ML
0.5 SYRINGE (ML) INJECTION
Status: DISCONTINUED | OUTPATIENT
Start: 2024-09-24 | End: 2024-09-24 | Stop reason: HOSPADM

## 2024-09-24 RX ORDER — MAGNESIUM HYDROXIDE 1200 MG/15ML
LIQUID ORAL AS NEEDED
Status: DISCONTINUED | OUTPATIENT
Start: 2024-09-24 | End: 2024-09-24 | Stop reason: HOSPADM

## 2024-09-24 RX ORDER — OXYCODONE AND ACETAMINOPHEN 5; 325 MG/1; MG/1
1 TABLET ORAL EVERY 8 HOURS PRN
Status: DISCONTINUED | OUTPATIENT
Start: 2024-09-24 | End: 2024-09-24 | Stop reason: HOSPADM

## 2024-09-24 RX ORDER — CEFAZOLIN SODIUM 2 G/50ML
2000 SOLUTION INTRAVENOUS ONCE
Status: COMPLETED | OUTPATIENT
Start: 2024-09-24 | End: 2024-09-24

## 2024-09-24 RX ORDER — CHLORHEXIDINE GLUCONATE 40 MG/ML
SOLUTION TOPICAL DAILY PRN
Status: DISCONTINUED | OUTPATIENT
Start: 2024-09-24 | End: 2024-09-24 | Stop reason: HOSPADM

## 2024-09-24 RX ORDER — CHLORHEXIDINE GLUCONATE ORAL RINSE 1.2 MG/ML
15 SOLUTION DENTAL ONCE
Status: COMPLETED | OUTPATIENT
Start: 2024-09-24 | End: 2024-09-24

## 2024-09-24 RX ORDER — DEXAMETHASONE SODIUM PHOSPHATE 10 MG/ML
INJECTION, SOLUTION INTRAMUSCULAR; INTRAVENOUS AS NEEDED
Status: DISCONTINUED | OUTPATIENT
Start: 2024-09-24 | End: 2024-09-24

## 2024-09-24 RX ORDER — ONDANSETRON 2 MG/ML
INJECTION INTRAMUSCULAR; INTRAVENOUS AS NEEDED
Status: DISCONTINUED | OUTPATIENT
Start: 2024-09-24 | End: 2024-09-24

## 2024-09-24 RX ORDER — OXYCODONE AND ACETAMINOPHEN 5; 325 MG/1; MG/1
1 TABLET ORAL EVERY 4 HOURS PRN
Qty: 12 TABLET | Refills: 0 | Status: SHIPPED | OUTPATIENT
Start: 2024-09-24

## 2024-09-24 RX ORDER — SCOLOPAMINE TRANSDERMAL SYSTEM 1 MG/1
1 PATCH, EXTENDED RELEASE TRANSDERMAL
Status: DISCONTINUED | OUTPATIENT
Start: 2024-09-24 | End: 2024-09-24 | Stop reason: HOSPADM

## 2024-09-24 RX ORDER — SODIUM CHLORIDE, SODIUM LACTATE, POTASSIUM CHLORIDE, CALCIUM CHLORIDE 600; 310; 30; 20 MG/100ML; MG/100ML; MG/100ML; MG/100ML
INJECTION, SOLUTION INTRAVENOUS CONTINUOUS PRN
Status: DISCONTINUED | OUTPATIENT
Start: 2024-09-24 | End: 2024-09-24

## 2024-09-24 RX ORDER — LIDOCAINE HYDROCHLORIDE 10 MG/ML
INJECTION, SOLUTION EPIDURAL; INFILTRATION; INTRACAUDAL; PERINEURAL AS NEEDED
Status: DISCONTINUED | OUTPATIENT
Start: 2024-09-24 | End: 2024-09-24

## 2024-09-24 RX ORDER — ONDANSETRON 2 MG/ML
4 INJECTION INTRAMUSCULAR; INTRAVENOUS ONCE AS NEEDED
Status: DISCONTINUED | OUTPATIENT
Start: 2024-09-24 | End: 2024-09-24 | Stop reason: HOSPADM

## 2024-09-24 RX ORDER — SODIUM CHLORIDE, SODIUM LACTATE, POTASSIUM CHLORIDE, CALCIUM CHLORIDE 600; 310; 30; 20 MG/100ML; MG/100ML; MG/100ML; MG/100ML
125 INJECTION, SOLUTION INTRAVENOUS CONTINUOUS
Status: DISCONTINUED | OUTPATIENT
Start: 2024-09-24 | End: 2024-09-24 | Stop reason: HOSPADM

## 2024-09-24 RX ADMIN — LIDOCAINE HYDROCHLORIDE 50 MG: 10 INJECTION, SOLUTION EPIDURAL; INFILTRATION; INTRACAUDAL; PERINEURAL at 07:29

## 2024-09-24 RX ADMIN — SODIUM CHLORIDE, SODIUM LACTATE, POTASSIUM CHLORIDE, AND CALCIUM CHLORIDE 125 ML/HR: .6; .31; .03; .02 INJECTION, SOLUTION INTRAVENOUS at 06:47

## 2024-09-24 RX ADMIN — PROPOFOL 60 MCG/KG/MIN: 10 INJECTION, EMULSION INTRAVENOUS at 07:34

## 2024-09-24 RX ADMIN — MIDAZOLAM 2 MG: 1 INJECTION INTRAMUSCULAR; INTRAVENOUS at 07:26

## 2024-09-24 RX ADMIN — KETOROLAC TROMETHAMINE 30 MG: 30 INJECTION, SOLUTION INTRAMUSCULAR at 08:48

## 2024-09-24 RX ADMIN — CEFAZOLIN SODIUM 2000 MG: 2 SOLUTION INTRAVENOUS at 07:30

## 2024-09-24 RX ADMIN — FENTANYL CITRATE 50 MCG: 50 INJECTION, SOLUTION INTRAMUSCULAR; INTRAVENOUS at 07:39

## 2024-09-24 RX ADMIN — SODIUM CHLORIDE, SODIUM LACTATE, POTASSIUM CHLORIDE, AND CALCIUM CHLORIDE: .6; .31; .03; .02 INJECTION, SOLUTION INTRAVENOUS at 07:26

## 2024-09-24 RX ADMIN — PROPOFOL 200 MG: 10 INJECTION, EMULSION INTRAVENOUS at 07:29

## 2024-09-24 RX ADMIN — DEXAMETHASONE SODIUM PHOSPHATE 10 MG: 10 INJECTION, SOLUTION INTRAMUSCULAR; INTRAVENOUS at 07:35

## 2024-09-24 RX ADMIN — FENTANYL CITRATE 50 MCG: 50 INJECTION, SOLUTION INTRAMUSCULAR; INTRAVENOUS at 07:28

## 2024-09-24 RX ADMIN — ONDANSETRON 4 MG: 2 INJECTION INTRAMUSCULAR; INTRAVENOUS at 07:35

## 2024-09-24 RX ADMIN — CHLORHEXIDINE GLUCONATE 15 ML: 1.2 RINSE ORAL at 06:48

## 2024-09-24 NOTE — DISCHARGE SUMMARY
Discharge Summary Outpatient Procedure Podiatry -   Moody Garrido 48 y.o. female MRN: [id number]  Unit/Bed#: OR POOL Encounter: [id number]    Admission Date: 9/24/2024     Admitting Diagnosis: Metatarsal deformity, left [M21.962]  Foot pain, left [M79.672]  Metatarsalgia, left foot [M77.42]  Hammertoe of left foot [M20.42]    Discharge Diagnosis: same    Procedures Performed: Weil osteotomy third metatarsal: 05785 (CPT®)  REPAIR HAMMERTOE third digit: 54899 (CPT®)    Complications: none    Condition at Discharge: stable    Discharge instructions/Information to patient and family:   See after visit summary for information provided to patient and family.      Provisions for Follow-Up Care/Important appointments:  See after visit summary for information related to follow-up care and any pertinent home health orders.      Discharge Medications:  See after visit summary for reconciled discharge medications provided to patient and family.

## 2024-09-24 NOTE — QUICK NOTE
"Patient seen and examined in same-day surgery center.  Patient was clinically evaluated and all of paper chart was reviewed.  Patient was not found to have any sort of changes in the last 30 days since they were seen and evaluated by their primary care provider.  Patient is still consistent with findings of being medically cleared for procedure.    /91   Pulse 60   Temp (!) 97.2 °F (36.2 °C) (Temporal)   Resp 18   Ht 5' 8\" (1.727 m)   Wt 86.2 kg (190 lb 0.6 oz)   SpO2 99%   BMI 28.89 kg/m²     Ga: Alert.   Heart: regular rate.   Lungs: CTABL  Abdomen: soft, non-tender, non-distended.   Extremities: well perfused, no cyanosis.   "

## 2024-09-24 NOTE — OP NOTE
OPERATIVE REPORT - Podiatry  PATIENT NAME: Moody Garrido    :  1975  MRN: [id number]  Pt Location: [facility] ROOM 04    SURGERY DATE: 2024    Surgeons and Role:     * Lalo Ramsey DPM - Primary     * Paul Cha DPM - Assisting    Pre-op Diagnosis:  Metatarsal deformity, left [M21.962]  Foot pain, left [M79.672]  Metatarsalgia, left foot [M77.42]  Hammertoe of left foot [M20.42]    Post-Op Diagnosis Codes:     * Metatarsal deformity, left [M21.962]     * Foot pain, left [M79.672]     * Metatarsalgia, left foot [M77.42]     * Hammertoe of left foot [M20.42]    Procedure(s) (LRB):  Weil osteotomy third metatarsal (Left)  REPAIR HAMMERTOE third digit (Left)    Specimen(s):  * No specimens in log *    Estimated Blood Loss:   Minimal    Drains:  * No LDAs found *    Anesthesia Type:   Choice with 10 ml of 0.5% Bupivacaine and 1% Lidocaine in a 1:1 mixture    Hemostasis:  - Atraumatic technique   - Manual compression   - Pneumatic ankle tourniquet   - Electrocautery     Materials:  Implant Name Type Inv. Item Serial No.  Lot No. LRB No. Used Action   SCREW JAMES 2 X 12MM QUICKFIX SML JOINT - [id number]  SCREW JAMES 2 X 12MM QUICKFIX SML JOINT  [employer]  Left 1 Implanted     Operative Findings:  - Consistent with diagnosis.     Complications:   None    Procedure and Technique:     Under mild sedation, the patient was brought into the operating room and placed on the operating room table in the supine position. IV sedation was achieved by anesthesia team and a universal timeout was performed where all parties are in agreement of correct patient, correct procedure and correct site. A pneumatic tourniquet was then placed over the patient's left lower extremity with ample padding. A 3rd ray block was performed consisting of 10 ml of 0.5% Bupivacaine and 1% Lidocaine in a 1:1 mixture. The foot was then prepped and draped in the usual aseptic manner. An esmarch bandage was used to exsangunate  the foot and the pneumatic tourniquet was then inflated to 250 mmHg.    Attention was then directed to the left foot, using a 15-blade a linear incision was made over the PIPJ and extended proximally to the dorsal aspect of the 3rd metatarsal head. Incision was then carried deep through subcutaneous tissue, ensuring to retract all vital neurovascular structures and cauterize bleeders as needed. Dissection was carried to the level of the EDL tendon. The tendon was then transected at that level in a z-lengthening technique, and carried back to the level of the 3rd metatarsophalangeal joint. The PIPJ was then exposed and the medial and lateral collateral ligaments were incised to expose the head of the proximal phalanx. By use of sagittal saw, the head of the proximal phalanx and base of the middle phalanx were resected. Next attention was directed to the 3rd metatarsophalangeal joint. A capsular incision was made and the capsule reflected medial laterally thus exposing the head of the 3rd metatarsal. A Weil type osteotomy was made with a sagittal saw from a dorsal distal to a proximal plantar angulation. Next using appropriate AO technique one snap-off screw was used as fixation across the osteotomy site in a dorsal to plantar fashion with good compression. The osteotomy was stable. The overhang dorsally was removed with a bone rongeur. Attention was then directed back to the 3rd digit, and k-wire was driven retrograde out through the distal digit. Alignment was confirmed on c-arm.     Surgical site was then irrigated with NSS. Tendon reapproximated and sutured with Ethibond suture. Deep closure with Vicryl suture. Skin edges reapproximated and closure with Nylon suture in horizontal mattress technique. The foot was then cleansed and dried, dressing applied consisting of Xeroform, 4x4 gauze, Inga, and ACE bandage.     The tourniquet was deflated and normal hyperemic response was noted to all digits. The patient  "tolerated the procedure and anesthesia well without immediate complications and transferred to PACU with vital signs stable.     Dr. Ramsey was present during the entire procedure and participated in all key aspects.    SIGNATURE: Paul Cha DPM  DATE: September 24, 2024  TIME: 8:54 AM      Portions of the record may have been created with voice recognition software. Occasional wrong word or \"sound a like\" substitutions may have occurred due to the inherent limitations of voice recognition software. Read the chart carefully and recognize, using context, where substitutions have occurred.          "

## 2024-09-24 NOTE — DISCHARGE INSTR - AVS FIRST PAGE
Dr. Lalo Ramsey, DPM  Post-Operative Instructions    1. Take your prescribed medication as directed.   2. Upon arrival at home, lie down and elevate your surgical foot on 2 pillows.  3. Stay off your feet as much as possible for the first 24-48 hours. This is when your feet first swell and may become painful. After 48 hours you may begin limited walking following these restrictions:   Weight-bearing as tolerated in surgical shoe.   4. Drink large quantities of water. Consume no alcohol. Continue a well-balanced diet.  5. Report any unusual discomfort or fever to this office.  6. A limited amount of discomfort and swelling is to be expected. In some cases the skin may take on a bruised appearance. The surgical cleansing solution that was applied to your foot prior to the operation is dark in color and the operation site may appear to be oozing when it actually is not.  7. A slight amount of blood is to be expected, and is no cause for alarm. Do not remove the dressings. If there is active bleeding and if the bleeding persists, add additional gauze to the bandage, apply direct pressure, elevate your feet and call this office.  8. Do not get the dressings wet. As regular bathing may be inconvenient, sponge baths are recommended.   9. When anesthesia wears off and if any discomfort should be present, apply an ice pack directly over the operated area for 15 minute intervals for several hours or until the pain leaves. (USE IN EXCESS OF 15 MINUTES COULD CAUSE FROSTBITE). Do not use hot water bags or electric pads. A convenient icepack can be made by placing ice cubes in a plastic bag and covering this with a towel.  10. Take over-the-counter laxative for constipation, this is common with use of narcotic medications.     
PAST SURGICAL HISTORY:  No significant past surgical history

## 2024-09-24 NOTE — PERIOPERATIVE NURSING NOTE
Patient received from PACU in 2/10 pain. Dressings were clean, dry, and intact. Neurovascular assessment WDL on the operative extremity. VSS. Will continue to monitor til discharge criteria is met.   PT tolerating gingeraleDavin at bedside.

## 2024-09-24 NOTE — ANESTHESIA POSTPROCEDURE EVALUATION
Post-Op Assessment Note    CV Status:  Stable  Pain Score: 0    Pain management: adequate       Mental Status:  Arousable and sleepy   Hydration Status:  Stable and euvolemic   PONV Controlled:  Controlled   Airway Patency:  Patent and adequate     Post Op Vitals Reviewed: Yes      Staff: CRNA               BP   150/81   Temp      Pulse  98   Resp   16   SpO2   98

## 2024-09-24 NOTE — ANESTHESIA PREPROCEDURE EVALUATION
Procedure:  Weil osteotomy third metatarsal (Left: Toe)  REPAIR HAMMERTOE third digit (Left: Foot)    Relevant Problems   ANESTHESIA   (+) PONV (postoperative nausea and vomiting)      Surgery/Wound/Pain   (+) Postlaminectomy syndrome      Denies recent fever, cough or other symptom of upper respiratory tract infection.    Confirmed NPO appropriate    Physical Exam    Airway    Mallampati score: III  TM Distance: >3 FB  Neck ROM: full     Dental   No notable dental hx     Cardiovascular      Pulmonary      Other Findings  post-pubertal.      Anesthesia Plan  ASA Score- 2     Anesthesia Type- general with ASA Monitors.         Additional Monitors:     Airway Plan: LMA.           Plan Factors-Exercise tolerance (METS): >4 METS.Exercise comment: Able to climb flight of stairs without cardiopulmonary limitation.    Chart reviewed.   Existing labs reviewed.     Patient is not a current smoker.  Patient did not smoke on day of surgery.            Induction- intravenous.    Postoperative Plan- Plan for postoperative opioid use. Planned trial extubation        Informed Consent- Anesthetic plan and risks discussed with patient.           Prophylactic measure

## 2024-09-24 NOTE — PROGRESS NOTES
"Podiatry - Progress Note  Patient: Moody Garrido 48 y.o. female   MRN: [id number]  PCP: Katarina Gonsalez MD  Unit/Bed#: [facility] Encounter: [id number]  Date Of Visit: 24    ASSESSMENT:    Moody Garrido is a 48 y.o. female with:    Metatarsal deformity left 3rd metatarsal  Metatarsalgia left foot  Hammertoe left 3rd metatarsal      PLAN:    Patient to go to OR today,24, for  left 3rd metatarsal weil osteotomy and 3rd digit hammertoe repair  Consent placed in chart.    H&P, vitals, and current labs reviewed.  No acute changes noted.  Alternatives, risks, and complications discussed with patient.  All questions answered.  No guarantees given to outcome of procedure.  Rest of medical care per primary team.       SUBJECTIVE:     The patient was seen, evaluated, and assessed at bedside today. The patient was awake, alert, and in no acute distress. Patient confirmed NPO status. All questions and concerns regarding the surgical procedure addressed. Patient understands risks vs benefits of procedure and remains amenable with plan for surgery today. Patient denies N/V/F/chills/SOB/CP.      OBJECTIVE:     Vitals:   /91   Pulse 60   Temp (!) 97.2 °F (36.2 °C) (Temporal)   Resp 18   Ht 5' 8\" (1.727 m)   Wt 86.2 kg (190 lb 0.6 oz)   SpO2 99%   BMI 28.89 kg/m²     Temp (24hrs), Av.2 °F (36.2 °C), Min:97.2 °F (36.2 °C), Max:97.2 °F (36.2 °C)      Physical Exam:     General:  Alert, cooperative, and in no distress.  Lower extremity exam:  Cardiovascular status at baseline.  Neurological status at baseline.  Musculoskeletal status at baseline. No calf tenderness noted bilaterally.     MSK:  -Pain on palpation to left plantar foot, submetatarsal 3  -Pain on palpation to the dorsal aspect of the third proximal interphalangeal joint  -Third digit is noted to be dorsiflexed at the level of the MTPJ, plantarflexed at the level of the PIPJ consistent with a hammertoe deformity.  -No gross deformities noted " "  -Active range of motion lesser digits intact  -First MTPJ range of motion within normal limits bilaterally  -Ankle dorsiflexion at least 10 degrees with knee extended, and knee flexed  -Prominent metatarsal heads noted plantarly, especially the 3rd metatarsal     Derm:  -No lesions, abrasions, or open wounds noted  -No noted interdigital maceration, peeling, malodor  -No callus formation noted on exam      Vascular:  -DP and PT pulses intact b/l  -Capillary refill time <2 sec b/l  -Digital hair growth: Present  -Skin temp: WNL      Additional Data:     Labs:              Invalid input(s): \"LABALBU\"        * I Have Reviewed All Lab Data Listed Above.    Recent Cultures (last 7 days):               Imaging: I have personally reviewed pertinent films in PACS  EKG, Pathology, and Other Studies: I have personally reviewed pertinent reports.    ** Please Note: Portions of the record may have been created with voice recognition software. Occasional wrong word or \"sound a like\" substitutions may have occurred due to the inherent limitations of voice recognition software. Read the chart carefully and recognize, using context, where substitutions have occurred. **      "

## 2024-09-25 ENCOUNTER — OFFICE VISIT (OUTPATIENT)
Dept: FAMILY MEDICINE CLINIC | Facility: CLINIC | Age: 49
End: 2024-09-25
Payer: COMMERCIAL

## 2024-09-25 VITALS
HEIGHT: 68 IN | WEIGHT: 190 LBS | BODY MASS INDEX: 28.79 KG/M2 | TEMPERATURE: 97.6 F | SYSTOLIC BLOOD PRESSURE: 120 MMHG | RESPIRATION RATE: 18 BRPM | OXYGEN SATURATION: 98 % | DIASTOLIC BLOOD PRESSURE: 84 MMHG | HEART RATE: 64 BPM

## 2024-09-25 DIAGNOSIS — B37.31 VAGINAL CANDIDOSIS: ICD-10-CM

## 2024-09-25 DIAGNOSIS — N30.01 ACUTE CYSTITIS WITH HEMATURIA: Primary | ICD-10-CM

## 2024-09-25 DIAGNOSIS — N30.10 IC (INTERSTITIAL CYSTITIS): ICD-10-CM

## 2024-09-25 LAB
SL AMB  POCT GLUCOSE, UA: ABNORMAL
SL AMB LEUKOCYTE ESTERASE,UA: 0
SL AMB POCT BILIRUBIN,UA: 6
SL AMB POCT BLOOD,UA: 0
SL AMB POCT CLARITY,UA: CLEAR
SL AMB POCT COLOR,UA: ABNORMAL
SL AMB POCT KETONES,UA: 0
SL AMB POCT NITRITE,UA: ABNORMAL
SL AMB POCT PH,UA: 5
SL AMB POCT SPECIFIC GRAVITY,UA: 1.02
SL AMB POCT URINE PROTEIN: 500
SL AMB POCT UROBILINOGEN: >12

## 2024-09-25 PROCEDURE — G2211 COMPLEX E/M VISIT ADD ON: HCPCS | Performed by: STUDENT IN AN ORGANIZED HEALTH CARE EDUCATION/TRAINING PROGRAM

## 2024-09-25 PROCEDURE — 81003 URINALYSIS AUTO W/O SCOPE: CPT | Performed by: STUDENT IN AN ORGANIZED HEALTH CARE EDUCATION/TRAINING PROGRAM

## 2024-09-25 PROCEDURE — 87086 URINE CULTURE/COLONY COUNT: CPT | Performed by: STUDENT IN AN ORGANIZED HEALTH CARE EDUCATION/TRAINING PROGRAM

## 2024-09-25 PROCEDURE — 99213 OFFICE O/P EST LOW 20 MIN: CPT | Performed by: STUDENT IN AN ORGANIZED HEALTH CARE EDUCATION/TRAINING PROGRAM

## 2024-09-25 RX ORDER — SULFAMETHOXAZOLE/TRIMETHOPRIM 800-160 MG
1 TABLET ORAL 2 TIMES DAILY
Qty: 14 TABLET | Refills: 0 | Status: SHIPPED | OUTPATIENT
Start: 2024-09-25 | End: 2024-10-02

## 2024-09-25 RX ORDER — FLUCONAZOLE 150 MG/1
150 TABLET ORAL ONCE
Qty: 1 TABLET | Refills: 0 | Status: SHIPPED | OUTPATIENT
Start: 2024-09-25 | End: 2024-09-25

## 2024-09-25 NOTE — PROGRESS NOTES
Ambulatory Visit  Name: Moody Garrido      : 1975      MRN: [id number]  Encounter Provider: Katarina Gonsalez MD  Encounter Date: 2024   Encounter department: [facility]    Assessment & Plan  Acute cystitis with hematuria    Orders:    sulfamethoxazole-trimethoprim (BACTRIM DS) 800-160 mg per tablet; Take 1 tablet by mouth 2 (two) times a day for 7 days    Urine dip positive for nitrites  Increase fluid hydration  Complete antibiotic  Monitor for fever and/or chills  Notify office if symptoms not improved with antibiotic      IC (interstitial cystitis)  -Continue Pyridium 95 mg TID PRN       Vaginal candidosis    Orders:    fluconazole (DIFLUCAN) 150 mg tablet; Take 1 tablet (150 mg total) by mouth once for 1 dose       History of Present Illness     HPI    Patient notes two days she developed urinary frequency. She notes she took a dose of ciprofloxacin yesterday which did not help. She also notes burning on urination and pain during sex. She does have pelvic pain and urgency. She had surgery on her left foot yesterday and is recovering.       Review of Systems   Constitutional:  Negative for activity change, chills, diaphoresis, fatigue and fever.   HENT:  Negative for congestion, postnasal drip, rhinorrhea and sore throat.    Respiratory:  Negative for cough, shortness of breath and wheezing.    Cardiovascular:  Negative for chest pain, palpitations and leg swelling.   Gastrointestinal:  Negative for abdominal pain, constipation, diarrhea, nausea and vomiting.   Genitourinary:  Positive for dyspareunia, dysuria, flank pain, frequency, pelvic pain and urgency. Negative for hematuria, vaginal bleeding and vaginal discharge.   Musculoskeletal:  Negative for myalgias.   Skin:  Negative for rash.   Neurological:  Negative for weakness, light-headedness and headaches.   Psychiatric/Behavioral:  The patient is not nervous/anxious.            Objective     /84   Pulse 64    "Temp 97.6 °F (36.4 °C) (Temporal)   Resp 18   Ht 5' 8\" (1.727 m)   Wt 86.2 kg (190 lb)   LMP 08/15/2023 (Approximate) Comment: post menapausal  SpO2 98%   BMI 28.89 kg/m²     Physical Exam  Constitutional:       Appearance: Normal appearance.   HENT:      Head: Normocephalic and atraumatic.   Cardiovascular:      Rate and Rhythm: Normal rate and regular rhythm.      Pulses: Normal pulses.      Heart sounds: Normal heart sounds.   Pulmonary:      Effort: Pulmonary effort is normal.      Breath sounds: Normal breath sounds.   Neurological:      General: No focal deficit present.      Mental Status: She is alert and oriented to person, place, and time.   Psychiatric:         Mood and Affect: Mood normal.         Behavior: Behavior normal.         Thought Content: Thought content normal.         Judgment: Judgment normal.         "

## 2024-09-26 LAB — BACTERIA UR CULT: NORMAL

## 2024-09-30 ENCOUNTER — TELEPHONE (OUTPATIENT)
Age: 49
End: 2024-09-30

## 2024-09-30 NOTE — TELEPHONE ENCOUNTER
Patient called states antibiotic did not help her UTI, is still having Urgency and burning. Would like to know if different antibiotic can be sent to [facility] in [place]

## 2024-10-01 ENCOUNTER — OFFICE VISIT (OUTPATIENT)
Age: 49
End: 2024-10-01

## 2024-10-01 ENCOUNTER — APPOINTMENT (OUTPATIENT)
Dept: RADIOLOGY | Facility: CLINIC | Age: 49
End: 2024-10-01
Payer: COMMERCIAL

## 2024-10-01 VITALS
BODY MASS INDEX: 28.79 KG/M2 | WEIGHT: 190 LBS | SYSTOLIC BLOOD PRESSURE: 136 MMHG | HEART RATE: 52 BPM | DIASTOLIC BLOOD PRESSURE: 85 MMHG | HEIGHT: 68 IN

## 2024-10-01 DIAGNOSIS — N30.01 ACUTE CYSTITIS WITH HEMATURIA: Primary | ICD-10-CM

## 2024-10-01 DIAGNOSIS — M20.32 HALLUX HAMMERTOE, LEFT: ICD-10-CM

## 2024-10-01 DIAGNOSIS — M20.32 HALLUX HAMMERTOE, LEFT: Primary | ICD-10-CM

## 2024-10-01 PROCEDURE — 73630 X-RAY EXAM OF FOOT: CPT

## 2024-10-01 PROCEDURE — 99024 POSTOP FOLLOW-UP VISIT: CPT | Performed by: STUDENT IN AN ORGANIZED HEALTH CARE EDUCATION/TRAINING PROGRAM

## 2024-10-01 RX ORDER — CIPROFLOXACIN 500 MG/1
500 TABLET, FILM COATED ORAL EVERY 12 HOURS SCHEDULED
Qty: 14 TABLET | Refills: 0 | Status: SHIPPED | OUTPATIENT
Start: 2024-10-01 | End: 2024-10-08

## 2024-10-01 NOTE — TELEPHONE ENCOUNTER
Patient called and aware. Will try the Cipro again as she took this prior to the Bactrim and it didn't seem to help . Her symptoms currently are burning when voiding, painful urination and urgency. She will call back if this course if not effective ans to see what the next step would be

## 2024-10-02 DIAGNOSIS — M77.42 METATARSALGIA OF LEFT FOOT: ICD-10-CM

## 2024-10-02 RX ORDER — MELOXICAM 15 MG/1
15 TABLET ORAL DAILY
Qty: 30 TABLET | Refills: 5 | Status: SHIPPED | OUTPATIENT
Start: 2024-10-02

## 2024-10-02 NOTE — PROGRESS NOTES
Post-Op Visit    Moody Garrido  1975      Subjective: Patient here for post-op appointment following right third metatarsal Weil osteotomy, third digit hammertoe repair. Patient denies significant pain at the surgical site, active strikethrough drainage, fevers, chills, nightsweats, SOB, chest pain, nor calf pain. The patient is in good spirits.Patient relates compliance with post-op instructions.    Objective: The patient appears in NAD / non-toxic. Primary dressing and splint/cast taken down for wound inspection.VSS. No signs of infection. No active drainage. Normal post-op edema. No necrosis, dehiscence.     Assessment/Plan:  Patient is stable post-op  Discussed compliance with weight bearing instructions, incision care, and rest.   Patient is to remain minimal weightbearing to the right foot with surgical shoe at all times  Incision site redressed with Xeroform, dry sterile dressing, Ace bandage.  This should be left clean, dry, and intact until the next visit  X-ray of the right foot from 10/1/2024 interpreted independently: Adequate fixation and positional alignment noted of the third metatarsal as well as third digit with K wire traversing the third toe from distal phalanx to proximal phalanx.    Call if any increase in pain, fevers, calf pain, shortness of breath, or general distress is noted. Patient instructed to go to ER if call is not returned immediately.

## 2024-10-09 ENCOUNTER — OFFICE VISIT (OUTPATIENT)
Age: 49
End: 2024-10-09

## 2024-10-09 VITALS
DIASTOLIC BLOOD PRESSURE: 85 MMHG | BODY MASS INDEX: 28.79 KG/M2 | HEIGHT: 68 IN | WEIGHT: 190 LBS | HEART RATE: 56 BPM | SYSTOLIC BLOOD PRESSURE: 125 MMHG

## 2024-10-09 DIAGNOSIS — M21.962 METATARSAL DEFORMITY, LEFT: ICD-10-CM

## 2024-10-09 DIAGNOSIS — M20.42 HAMMERTOE OF LEFT FOOT: ICD-10-CM

## 2024-10-09 DIAGNOSIS — M77.42 METATARSALGIA OF LEFT FOOT: Primary | ICD-10-CM

## 2024-10-09 PROCEDURE — 99024 POSTOP FOLLOW-UP VISIT: CPT | Performed by: STUDENT IN AN ORGANIZED HEALTH CARE EDUCATION/TRAINING PROGRAM

## 2024-10-09 RX ORDER — FLUCONAZOLE 150 MG/1
TABLET ORAL
COMMUNITY
Start: 2024-09-25

## 2024-10-09 NOTE — TELEPHONE ENCOUNTER
As a final attempt, a third outreach has been made via telephone call to facility. Please see Contacts section for details. This encounter will be closed and completed by end of day. Should we receive the requested information because of previous outreach attempts, the requested patient's chart will be updated appropriately.     Thank you  Paul Peterson MA

## 2024-10-09 NOTE — PROGRESS NOTES
Post-Op Visit    Moody Garrido  1975      Subjective: Patient here for post-op appointment following right third metatarsal Weil osteotomy, third digit hammertoe repair. Patient denies significant pain at the surgical site, active strikethrough drainage, fevers, chills, nightsweats, SOB, chest pain, nor calf pain. The patient is in good spirits.Patient relates compliance with post-op instructions.    Objective: The patient appears in NAD / non-toxic. Primary dressing and splint/cast taken down for wound inspection.VSS. No signs of infection. No active drainage. Normal post-op edema. No necrosis, dehiscence.     Assessment/Plan:  Patient is stable post-op  Discussed compliance with weight bearing instructions, incision care, and rest.   Patient is to remain minimal weightbearing to the right foot with surgical shoe at all times  Incision site redressed with Xeroform, dry sterile dressing, Ace bandage.  This should be left clean, dry, and intact until the next visit  All sutures removed today without incident  Return to clinic 1 week    Call if any increase in pain, fevers, calf pain, shortness of breath, or general distress is noted. Patient instructed to go to ER if call is not returned immediately.

## 2024-10-10 ENCOUNTER — TELEPHONE (OUTPATIENT)
Dept: PAIN MEDICINE | Facility: CLINIC | Age: 49
End: 2024-10-10

## 2024-10-10 ENCOUNTER — APPOINTMENT (OUTPATIENT)
Dept: RADIOLOGY | Facility: CLINIC | Age: 49
End: 2024-10-10
Payer: COMMERCIAL

## 2024-10-10 ENCOUNTER — CONSULT (OUTPATIENT)
Dept: PAIN MEDICINE | Facility: CLINIC | Age: 49
End: 2024-10-10
Payer: COMMERCIAL

## 2024-10-10 VITALS
SYSTOLIC BLOOD PRESSURE: 132 MMHG | HEIGHT: 68 IN | HEART RATE: 82 BPM | BODY MASS INDEX: 28.79 KG/M2 | DIASTOLIC BLOOD PRESSURE: 82 MMHG | WEIGHT: 190 LBS

## 2024-10-10 DIAGNOSIS — M79.7 FIBROMYALGIA: ICD-10-CM

## 2024-10-10 DIAGNOSIS — M96.1 FAILED BACK SYNDROME: ICD-10-CM

## 2024-10-10 DIAGNOSIS — M46.1 SACROILIITIS (HCC): ICD-10-CM

## 2024-10-10 DIAGNOSIS — M48.07 SPINAL STENOSIS OF LUMBOSACRAL REGION: Primary | ICD-10-CM

## 2024-10-10 DIAGNOSIS — M96.1 POSTLAMINECTOMY SYNDROME: ICD-10-CM

## 2024-10-10 DIAGNOSIS — M48.07 SPINAL STENOSIS OF LUMBOSACRAL REGION: ICD-10-CM

## 2024-10-10 PROCEDURE — 72100 X-RAY EXAM L-S SPINE 2/3 VWS: CPT

## 2024-10-10 PROCEDURE — 99204 OFFICE O/P NEW MOD 45 MIN: CPT | Performed by: STUDENT IN AN ORGANIZED HEALTH CARE EDUCATION/TRAINING PROGRAM

## 2024-10-10 RX ORDER — METHOCARBAMOL 750 MG/1
750 TABLET, FILM COATED ORAL 3 TIMES DAILY PRN
Qty: 90 TABLET | Refills: 0 | Status: SHIPPED | OUTPATIENT
Start: 2024-10-10 | End: 2024-11-09

## 2024-10-10 NOTE — TELEPHONE ENCOUNTER
----- Message from Prabhakar Mao MD sent at 10/10/2024 10:45 AM EDT -----  DD with disc space narrowing with intact hardware from fusion and SI fusion. No change in management.

## 2024-10-10 NOTE — PROGRESS NOTES
Assessment:  1. Fibromyalgia    2. Failed back syndrome    3. Spinal stenosis of lumbosacral region    Patient is a 48-year-old woman presenting with 16 years of low back pain with left-sided radicular symptoms along with neck pain.  Of note patient does have a fusion from L3-S1 and has underwent spinal cord stimulator therapy.  The pain started after injury in April 2000.  Over the past month the intensity of pain has been severe.  She rates pain currently is 8 out of 10 on numeric rating scale and the pain does interfere with her daily activities.  The pain occurs constantly and pain is worse in the evening.  She describes pain as burning, shooting, sharp, numbing, cutting, pins-and-needles like with some associated weakness in the lower extremities and she does not use any assistive devices. Increase her pain include prayer, lying down, standing, bending, sitting, walking.  Patient is have MRI of the cervical spine from March 2022 which showed mild degenerative cervical spondylosis with mild spinal stenosis C5-C6 with foraminal narrowing at C4-C5.  She has no recent imaging of her lumbar spine.  Past medical history severe anxiety, depression, fibromyalgia, reflux, stomach ulcers.  Prior pain treatments include surgery which tried no relief, nerve injections provided moderate relief, he denies provides moderate relief and TENS unit with tries moderate relief.  Patient does use marijuana and drinks socially.  She does not smoke tobacco.    On further discussion with patient she states she was seeing pain management in [place] which was really helpful.  She states she was on a muscle relaxant, Savella and undergoing physical therapy which she found quite helpful.  Now that she is reestablishing [place] she is looking to restart those things.  On exam patient with tender to palpation midline lumbar spine, left buttocks with paresthesias in the left lower extremity.  Patient symptoms likely multifactorial  related to failed back syndrome, spinal stenosis, sacroiliitis.  For further evaluation will get x-ray of the lumbar spine in the office today.  Additionally will place a new referral for physical therapy.  For medication management we will start Robaxin-750 milligrams 3 times daily as needed along with Savella 12.5 mg twice daily.  Titration schedule provided.  Patient follow-up in 4 weeks for further medication management.    Plan:  X-ray lumbar spine    Amatory referral to physical therapy    Start Savella 12.5 mg twice daily.  Titration schedule provided    Start Robaxin-750 milligrams 3 times daily as needed    Follow-up in 4 weeks  No orders of the defined types were placed in this encounter.      No orders of the defined types were placed in this encounter.      My impressions and treatment recommendations were discussed in detail with the patient, who verbalized understanding and had no further questions.      Follow-up is planned in four weeks time or sooner as warranted.  Discharge instructions were provided. I personally saw and examined the patient and I agree with the above discussed plan of care.    History of Present Illness:    Moody Garrido is a 48 y.o. female who presents to [facility] for initial evaluation of the above stated pain complaints. The patient has a past medical and chronic pain history as outlined in the assessment section. She was referred by Katarina Gonsalez MD  [address] [place] [id number].    Patient is a 48-year-old woman presenting with 16 years of low back pain with left-sided radicular symptoms along with neck pain.  Of note patient does have a fusion from L3-S1 and has underwent spinal cord stimulator therapy.  The pain started after injury in April 2000.  Over the past month the intensity of pain has been severe.  She rates pain currently is 8 out of 10 on numeric rating scale and the pain does interfere with her daily activities.  The pain  occurs constantly and pain is worse in the evening.  She describes pain as burning, shooting, sharp, numbing, cutting, pins-and-needles like with some associated weakness in the lower extremities and she does not use any assistive devices. Increase her pain include prayer, lying down, standing, bending, sitting, walking.  Patient is have MRI of the cervical spine from March 2022 which showed mild degenerative cervical spondylosis with mild spinal stenosis C5-C6 with foraminal narrowing at C4-C5.  She has no recent imaging of her lumbar spine.  Past medical history severe anxiety, depression, fibromyalgia, reflux, stomach ulcers.  Prior pain treatments include surgery which tried no relief, nerve injections provided moderate relief, he denies provides moderate relief and TENS unit with tries moderate relief.  Patient does use marijuana and drinks socially.  She does not smoke tobacco.    Review of Systems:    Review of Systems   Constitutional:  Negative for chills and fatigue.   HENT:  Negative for ear pain, mouth sores and sinus pressure.    Eyes:  Negative for pain, redness and visual disturbance.   Respiratory:  Negative for shortness of breath and wheezing.    Cardiovascular:  Negative for chest pain and palpitations.   Gastrointestinal:  Positive for constipation. Negative for abdominal pain and nausea.   Endocrine: Negative for polyphagia.   Musculoskeletal:  Positive for back pain, gait problem, joint swelling, neck pain and neck stiffness. Negative for arthralgias.   Skin:  Negative for wound.   Neurological:  Positive for dizziness, weakness, numbness and headaches. Negative for seizures.   Psychiatric/Behavioral:  Positive for decreased concentration, dysphoric mood and sleep disturbance. The patient is nervous/anxious.          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    Vaginosis        Past Surgical History:   Procedure Laterality Date    ABDOMINAL SURGERY      second surgery for endometriosis    BACK SURGERY      CHOLECYSTECTOMY      LAPAROSCOPIC ENDOMETRIOSIS FULGURATION      NE CORRECTION HAMMERTOE Left 9/24/2024    Procedure: REPAIR HAMMERTOE third digit;  Surgeon: Lalo Ramsey DPM;  Location: [facility];  Service: Podiatry    NE OSTEOT W/WO LNGTH SHRT/CORRJ METAR XCP 1ST EA Left 9/24/2024    Procedure: Weil osteotomy third metatarsal;  Surgeon: Lalo Ramsey DPM;  Location: [facility];  Service: Podiatry       Family History   Problem Relation Age of Onset    Heart disease Mother     Diabetes Mother     Heart disease Father     Diabetes Father        Social History     Occupational History    Not on file   Tobacco Use    Smoking status: Never     Passive exposure: Past    Smokeless tobacco: Never   Vaping Use    Vaping status: Never Used   Substance and Sexual Activity    Alcohol use: Yes     Comment: socially    Drug use: Yes     Types: Marijuana     Comment: occ    Sexual activity: Not on file         Current Outpatient Medications:     fluconazole (DIFLUCAN) 150 mg tablet, , Disp: , Rfl:     meloxicam (MOBIC) 15 mg tablet, TAKE 1 TABLET(15 MG) BY MOUTH DAILY, Disp: 30 tablet, Rfl: 5    omeprazole (PriLOSEC) 40 MG capsule, Take 40 mg by mouth daily, Disp: , Rfl:     phenazopyridine (PYRIDIUM) 95 MG tablet, Take 190 mg by mouth 3 (three) times a day as needed for bladder spasms, Disp: , Rfl:     sucralfate (CARAFATE) 1 g tablet, Take 1 g by mouth 4 (four) times a day, Disp: , Rfl:     oxyCODONE-acetaminophen (Percocet) 5-325 mg per tablet, Take 1 tablet by mouth every 4 (four) hours as needed for moderate pain for up to 12 doses Max Daily Amount: 6 tablets (Patient not taking: Reported on  "10/10/2024), Disp: 12 tablet, Rfl: 0    No Known Allergies    Physical Exam:    /82   Pulse 82   Ht 5' 8\" (1.727 m)   Wt 86.2 kg (190 lb)   LMP 08/15/2023 (Approximate) Comment: post menapausal  BMI 28.89 kg/m²     Constitutional: normal, well developed, well nourished, alert, in no distress and non-toxic and no overt pain behavior.  Eyes: anicteric  HEENT: grossly intact  Neck: supple, symmetric, trachea midline and no masses   Pulmonary:even and unlabored  Cardiovascular:No edema or pitting edema present  Skin:Normal without rashes or lesions and well hydrated  Psychiatric:Mood and affect appropriate  Neurologic:Cranial Nerves II-XII grossly intact  Musculoskeletal:normal gait. Brace on left foot. TTP in midline lumbar spine and left buttocks over SI joint.     Imaging     [facility]  Outside Information  Results  MRI Cervical Spine wo Contrast (Generic) (Order [id number])      suggestion  Information displayed in this report may not trend or trigger automated decision support.     MRI Cervical Spine wo Contrast (Generic)  Order: [id number]  Impression      1. Mild degenerative cervical spondylosis with mild spinal stenosis at C5-6. The  right C4-5 foramen is also mildly narrowed. No disc herniation nor spinal cord  lesions.  2. No acute findings.    Thank you for letting us participate in the care of this patient.  If you are a  health care provider and have any questions regarding this report, please  contact the number below.  For patients who have questions please contact the  health care professional that requested your imaging first.                                                                                                      Electronically signed by: RENITA VALENCIA MD, [facility] ([phone]), at  3/4/2022 12:06 AM  Narrative    EXAMINATION: MRI CERVICAL SPINE WO CONTRAST (GENERIC)    CLINICAL HISTORY: Neck pain, chronic; Cervical " radiculopathy        TECHNIQUE:  MRI of the cervical spine performed without intravenous contrast administration.  Motion artifact degrades imaging mildly.    COMPARISON:  None    FINDINGS:  There is straightening of the normally lordotic cervical spine curvature. I see  no focal vertebral subluxation nor obvious trauma. No perivertebral inflammation  nor collections.  There are mild degenerative facet changes at the corresponding levels.  There are mild degenerative disc changes at C4-5 and C5-6 characterized by disc  desiccation and diffuse disc bulging.    At C4-5, there is mild right foraminal narrowing on the basis of disc bulging  and facet hypertrophy. The central canal and left foramen remain patent.    At C5-6, there is mild spinal stenosis on the basis of diffuse disc bulging  primarily. The neural foramina remain patent.    I see no other significant central canal nor foraminal encroachment.  Spinal cord caliber and signal intensity are normal.  There is no true disc herniation.  Exam End: 03/03/22  3:27 PM Last Resulted: 03/04/22 12:06 AM   [facility]  Result Received: 09/11/24  8:38 AM    View Encounter        Received Information            No orders to display       No orders of the defined types were placed in this encounter.

## 2024-10-16 ENCOUNTER — OFFICE VISIT (OUTPATIENT)
Age: 49
End: 2024-10-16

## 2024-10-16 VITALS
DIASTOLIC BLOOD PRESSURE: 88 MMHG | BODY MASS INDEX: 28.79 KG/M2 | SYSTOLIC BLOOD PRESSURE: 128 MMHG | HEIGHT: 68 IN | WEIGHT: 190 LBS | HEART RATE: 59 BPM

## 2024-10-16 DIAGNOSIS — Z98.890 POST-OPERATIVE STATE: Primary | ICD-10-CM

## 2024-10-16 DIAGNOSIS — M21.962 METATARSAL DEFORMITY, LEFT: ICD-10-CM

## 2024-10-16 DIAGNOSIS — M20.42 HAMMERTOE OF LEFT FOOT: ICD-10-CM

## 2024-10-16 PROCEDURE — 99024 POSTOP FOLLOW-UP VISIT: CPT | Performed by: STUDENT IN AN ORGANIZED HEALTH CARE EDUCATION/TRAINING PROGRAM

## 2024-10-16 NOTE — PROGRESS NOTES
"[facility] Office Visit    ASSESSMENT     Diagnoses and all orders for this visit:    Post-operative state    Metatarsal deformity, left    Hammertoe of left foot         Problem List Items Addressed This Visit    None  Visit Diagnoses       Post-operative state    -  Primary    Metatarsal deformity, left        Hammertoe of left foot              PLAN  -Moody and I discussed her left foot  -Will leave K wire in for 1 more week  -Incision site to the dorsal foot remains fully healed this time  -Continue weightbearing as tolerated in surgical shoe  -Return to clinic 1 week for repeat x-rays and pin removal    SUBJECTIVE    Chief Complaint:  Status post left Weil osteotomy with hammertoe repair third digit     Patient ID: Moody Garrido     10/16/2024: Moody is doing well today.  She does still endorse minor pain and swelling to the left foot but states that overall her pain is improving.        The following portions of the patient's history were reviewed and updated as appropriate: allergies, current medications, past family history, past medical history, past social history, past surgical history and problem list.    Review of Systems   Constitutional: Negative.    HENT: Negative.     Respiratory: Negative.     Cardiovascular: Negative.    Gastrointestinal: Negative.    Musculoskeletal: Negative.    Skin: Negative.    Neurological: Negative.          OBJECTIVE      /88   Pulse 59   Ht 5' 8\" (1.727 m)   Wt 86.2 kg (190 lb)   LMP 08/15/2023 (Approximate) Comment: post menapausal  BMI 28.89 kg/m²        Physical Exam  Constitutional:       Appearance: Normal appearance.   HENT:      Head: Normocephalic and atraumatic.   Eyes:      General:         Right eye: No discharge.         Left eye: No discharge.   Cardiovascular:      Rate and Rhythm: Normal rate and regular rhythm.      Pulses:           Dorsalis pedis pulses are 2+ on the right side and 2+ on the left side.        " Posterior tibial pulses are 2+ on the right side and 2+ on the left side.   Pulmonary:      Effort: Pulmonary effort is normal.      Breath sounds: Normal breath sounds.   Skin:     General: Skin is warm.      Capillary Refill: Capillary refill takes less than 2 seconds.   Neurological:      Sensory: Sensation is intact. No sensory deficit.         Vascular:  -DP and PT pulses intact b/l  -Capillary refill time <2 sec b/l  -Digital hair growth: Present  -Skin temp: WNL    MSK:  -Pain on palpation negative  -No gross deformities noted  -MMT is 5/5 to all muscle compartments of the lower extremity  -Ankle dorsiflexion >10 degrees with knee extended and knee flexed b/l    Neuro:  -Light sensation intact bilaterally  -Protective sensation intact bilaterally with 10/10 points felt with Bluebell Shaq monofilament    Derm:  -No lesions, abrasions, or open wounds noted  -No noted interdigital maceration, peeling, malodor  -No callus formation noted on exam  -Pin site is clean, dry, with no local signs of infection.  Incision site to the dorsal foot remains fully healed at this time.

## 2024-10-22 ENCOUNTER — EVALUATION (OUTPATIENT)
Dept: PHYSICAL THERAPY | Facility: CLINIC | Age: 49
End: 2024-10-22
Payer: COMMERCIAL

## 2024-10-22 DIAGNOSIS — G89.29 CHRONIC LEFT-SIDED LOW BACK PAIN, UNSPECIFIED WHETHER SCIATICA PRESENT: Primary | ICD-10-CM

## 2024-10-22 DIAGNOSIS — M54.16 LUMBAR RADICULOPATHY: ICD-10-CM

## 2024-10-22 DIAGNOSIS — M48.07 SPINAL STENOSIS OF LUMBOSACRAL REGION: ICD-10-CM

## 2024-10-22 DIAGNOSIS — M96.1 FAILED BACK SYNDROME: ICD-10-CM

## 2024-10-22 DIAGNOSIS — M79.7 FIBROMYALGIA: ICD-10-CM

## 2024-10-22 DIAGNOSIS — M54.50 CHRONIC LEFT-SIDED LOW BACK PAIN, UNSPECIFIED WHETHER SCIATICA PRESENT: Primary | ICD-10-CM

## 2024-10-22 PROCEDURE — 97162 PT EVAL MOD COMPLEX 30 MIN: CPT

## 2024-10-23 ENCOUNTER — OFFICE VISIT (OUTPATIENT)
Age: 49
End: 2024-10-23

## 2024-10-23 ENCOUNTER — APPOINTMENT (OUTPATIENT)
Dept: RADIOLOGY | Facility: CLINIC | Age: 49
End: 2024-10-23
Payer: COMMERCIAL

## 2024-10-23 VITALS
SYSTOLIC BLOOD PRESSURE: 146 MMHG | HEIGHT: 68 IN | WEIGHT: 190 LBS | DIASTOLIC BLOOD PRESSURE: 91 MMHG | HEART RATE: 70 BPM | BODY MASS INDEX: 28.79 KG/M2

## 2024-10-23 DIAGNOSIS — M20.42 HAMMERTOE OF LEFT FOOT: ICD-10-CM

## 2024-10-23 DIAGNOSIS — M21.962 METATARSAL DEFORMITY, LEFT: ICD-10-CM

## 2024-10-23 DIAGNOSIS — Z98.890 POST-OPERATIVE STATE: ICD-10-CM

## 2024-10-23 DIAGNOSIS — M20.42 HAMMERTOE OF LEFT FOOT: Primary | ICD-10-CM

## 2024-10-23 PROCEDURE — 99024 POSTOP FOLLOW-UP VISIT: CPT | Performed by: STUDENT IN AN ORGANIZED HEALTH CARE EDUCATION/TRAINING PROGRAM

## 2024-10-23 PROCEDURE — 73630 X-RAY EXAM OF FOOT: CPT

## 2024-10-23 NOTE — PROGRESS NOTES
"[facility] Podiatric Medicine and Surgery Office Visit    ASSESSMENT     Diagnoses and all orders for this visit:    Hammertoe of left foot  -     XR foot 3+ vw left; Future    Metatarsal deformity, left    Post-operative state           Problem List Items Addressed This Visit    None  Visit Diagnoses       Hammertoe of left foot    -  Primary    Relevant Orders    XR foot 3+ vw left    Metatarsal deformity, left        Post-operative state                PLAN  -Moody and I discussed her left foot  -K wire removed from third digit today without incident  -Continue weightbearing as tolerated in surgical shoe  -Return to clinic 2 weeks, will consider transition into sneakers at this time    SUBJECTIVE    Chief Complaint:  Status post left Weil osteotomy with hammertoe repair third digit     Patient ID: Moody Garrido     10/16/2024: Moody is doing well today.  She does still endorse minor pain and swelling to the left foot but states that overall her pain is improving.    10/23/2024: Jessica is overall doing well today. She states that she still is in a little bit of pain but has improved since her last visit.       The following portions of the patient's history were reviewed and updated as appropriate: allergies, current medications, past family history, past medical history, past social history, past surgical history and problem list.    Review of Systems   Constitutional: Negative.    HENT: Negative.     Respiratory: Negative.     Cardiovascular: Negative.    Gastrointestinal: Negative.    Musculoskeletal: Negative.    Skin: Negative.    Neurological: Negative.          OBJECTIVE      /91   Pulse 70   Ht 5' 8\" (1.727 m)   Wt 86.2 kg (190 lb)   LMP 08/15/2023 (Approximate) Comment: post menapausal  BMI 28.89 kg/m²        Physical Exam  Constitutional:       Appearance: Normal appearance.   HENT:      Head: Normocephalic and atraumatic.   Eyes:      General:         Right eye: No discharge.         Left " eye: No discharge.   Cardiovascular:      Rate and Rhythm: Normal rate and regular rhythm.      Pulses:           Dorsalis pedis pulses are 2+ on the right side and 2+ on the left side.        Posterior tibial pulses are 2+ on the right side and 2+ on the left side.   Pulmonary:      Effort: Pulmonary effort is normal.      Breath sounds: Normal breath sounds.   Skin:     General: Skin is warm.      Capillary Refill: Capillary refill takes less than 2 seconds.   Neurological:      Sensory: Sensation is intact. No sensory deficit.         Vascular:  -DP and PT pulses intact b/l  -Capillary refill time <2 sec b/l  -Digital hair growth: Present  -Skin temp: WNL    MSK:  -Pain on palpation negative  -No gross deformities noted  -MMT is 5/5 to all muscle compartments of the lower extremity  -Ankle dorsiflexion >10 degrees with knee extended and knee flexed b/l  -Left third digit remains in rectus position    Neuro:  -Light sensation intact bilaterally  -Protective sensation intact bilaterally    Derm:  -No lesions, abrasions, or open wounds noted  -No noted interdigital maceration, peeling, malodor  -No callus formation noted on exam  -Pin site is clean, dry, with no local signs of infection.

## 2024-10-29 ENCOUNTER — OFFICE VISIT (OUTPATIENT)
Dept: PHYSICAL THERAPY | Facility: CLINIC | Age: 49
End: 2024-10-29
Payer: COMMERCIAL

## 2024-10-29 DIAGNOSIS — M54.50 CHRONIC LEFT-SIDED LOW BACK PAIN, UNSPECIFIED WHETHER SCIATICA PRESENT: ICD-10-CM

## 2024-10-29 DIAGNOSIS — G89.29 CHRONIC LEFT-SIDED LOW BACK PAIN, UNSPECIFIED WHETHER SCIATICA PRESENT: ICD-10-CM

## 2024-10-29 DIAGNOSIS — M79.7 FIBROMYALGIA: Primary | ICD-10-CM

## 2024-10-29 DIAGNOSIS — M54.16 LUMBAR RADICULOPATHY: ICD-10-CM

## 2024-10-29 DIAGNOSIS — M48.07 SPINAL STENOSIS OF LUMBOSACRAL REGION: ICD-10-CM

## 2024-10-29 DIAGNOSIS — M96.1 FAILED BACK SYNDROME: ICD-10-CM

## 2024-10-29 PROCEDURE — 97112 NEUROMUSCULAR REEDUCATION: CPT

## 2024-10-29 PROCEDURE — 97110 THERAPEUTIC EXERCISES: CPT

## 2024-10-29 NOTE — PROGRESS NOTES
Daily Note     Today's date: 10/29/2024  Patient name: Moody Garrido  : 1975  MRN: [id number]  Referring provider: Prabhakar Mao MD  Dx:   Encounter Diagnosis     ICD-10-CM    1. Fibromyalgia  M79.7       2. Failed back syndrome  M96.1       3. Spinal stenosis of lumbosacral region  M48.07       4. Lumbar radiculopathy  M54.16       5. Chronic left-sided low back pain, unspecified whether sciatica present  M54.50     G89.29           Start Time: 804  Stop Time: 845  Total time in clinic (min): 41 minutes    Subjective: Pt reports that she was having increased irritation of her leg with her YARELI stretch, she felt the best with her back during the FEMI. Pt states since getting the pin out of her foot last week her foot has been much worse and more painful, she has noticed increased swelling as well. Pt arrived to PT having numbness and tingling down her leg and mild-moderate pain in her low back.       Objective: See treatment diary below      Assessment: Tolerated treatment well. Patient demonstrated fatigue post treatment, exhibited good technique with therapeutic exercises, and would benefit from continued PT. Pt needed moderate VC and minimal TC for hip positioning and coordination of her pelvis during her stretches and abdominal exercises. Lightly progressed based on pt's tolerance. HEP updated with more abdominal strengthening exercises with neutral spine position.       Plan: Continue per plan of care.  Progress treatment as tolerated.        Insurance:  AMA/CMS Eval/ Re-eval Auth #/ Referral # Total units or visits Start date  Expiration date KX? Visit limitation?  PT only or  PT+OT? Co-Insurance   CMS 10/22/24 [id number] 16 10/22/24 12/17/25    $25 copay                 POC Start Date POC Expiration Date Signed POC?   10/22/24 12/17/24 PENDING      Date 10/22 10/29             Visits/Units:  Used 1 2             Authed:  Remaining  15 14                   Precautions: hx of spinal fusion, recent  foot surgery in boot (WBAT)  HEP: Access Code [email or web address]     Date    10/29 10/22/24   Visit Number    2 1 (IE)   Manuals                                        Neuro Re-Ed         Pelvic tilts    Post- 1x10  Ant- 1x10    Bent knee fallout    W/TA 1x15 B    SLR w/TA    1x10 BLE                                    Ther Ex        FEMI    4 mins    Piriformis str    2x30s B    YARELI str    2x30s B    Modified chayito str    2x30s B                                    Ther Activity                        Gait Training                        Modalities

## 2024-10-31 ENCOUNTER — APPOINTMENT (OUTPATIENT)
Dept: PHYSICAL THERAPY | Facility: CLINIC | Age: 49
End: 2024-10-31
Payer: COMMERCIAL

## 2024-11-05 ENCOUNTER — APPOINTMENT (OUTPATIENT)
Dept: PHYSICAL THERAPY | Facility: CLINIC | Age: 49
End: 2024-11-05
Payer: COMMERCIAL

## 2024-11-06 ENCOUNTER — APPOINTMENT (EMERGENCY)
Dept: RADIOLOGY | Facility: HOSPITAL | Age: 49
End: 2024-11-06
Payer: COMMERCIAL

## 2024-11-06 ENCOUNTER — OFFICE VISIT (OUTPATIENT)
Dept: FAMILY MEDICINE CLINIC | Facility: CLINIC | Age: 49
End: 2024-11-06
Payer: COMMERCIAL

## 2024-11-06 ENCOUNTER — NURSE TRIAGE (OUTPATIENT)
Age: 49
End: 2024-11-06

## 2024-11-06 ENCOUNTER — HOSPITAL ENCOUNTER (EMERGENCY)
Facility: HOSPITAL | Age: 49
Discharge: HOME | End: 2024-11-06
Attending: EMERGENCY MEDICINE
Payer: COMMERCIAL

## 2024-11-06 ENCOUNTER — OFFICE VISIT (OUTPATIENT)
Age: 49
End: 2024-11-06

## 2024-11-06 VITALS
BODY MASS INDEX: 28.79 KG/M2 | OXYGEN SATURATION: 98 % | TEMPERATURE: 97.8 F | DIASTOLIC BLOOD PRESSURE: 110 MMHG | WEIGHT: 190 LBS | SYSTOLIC BLOOD PRESSURE: 198 MMHG | HEART RATE: 111 BPM | HEIGHT: 68 IN | RESPIRATION RATE: 22 BRPM

## 2024-11-06 VITALS
HEIGHT: 68 IN | BODY MASS INDEX: 28.79 KG/M2 | DIASTOLIC BLOOD PRESSURE: 107 MMHG | SYSTOLIC BLOOD PRESSURE: 178 MMHG | HEART RATE: 80 BPM | WEIGHT: 190 LBS

## 2024-11-06 VITALS
SYSTOLIC BLOOD PRESSURE: 149 MMHG | TEMPERATURE: 99.5 F | HEART RATE: 89 BPM | OXYGEN SATURATION: 98 % | DIASTOLIC BLOOD PRESSURE: 89 MMHG | RESPIRATION RATE: 18 BRPM

## 2024-11-06 DIAGNOSIS — M20.42 HAMMERTOE OF LEFT FOOT: Primary | ICD-10-CM

## 2024-11-06 DIAGNOSIS — I10 HYPERTENSION: Primary | ICD-10-CM

## 2024-11-06 DIAGNOSIS — R07.9 CHEST PAIN: ICD-10-CM

## 2024-11-06 DIAGNOSIS — M21.962 METATARSAL DEFORMITY, LEFT: ICD-10-CM

## 2024-11-06 DIAGNOSIS — I10 PRIMARY HYPERTENSION: Primary | ICD-10-CM

## 2024-11-06 DIAGNOSIS — Z98.890 POST-OPERATIVE STATE: ICD-10-CM

## 2024-11-06 LAB
ALBUMIN SERPL BCG-MCNC: 4.6 G/DL (ref 3.5–5)
ALP SERPL-CCNC: 51 U/L (ref 34–104)
ALT SERPL W P-5'-P-CCNC: 21 U/L (ref 7–52)
ANION GAP SERPL CALCULATED.3IONS-SCNC: 11 MMOL/L (ref 4–13)
AST SERPL W P-5'-P-CCNC: 25 U/L (ref 13–39)
BASOPHILS # BLD AUTO: 0.05 THOUSANDS/ΜL (ref 0–0.1)
BASOPHILS NFR BLD AUTO: 1 % (ref 0–1)
BILIRUB SERPL-MCNC: 0.96 MG/DL (ref 0.2–1)
BUN SERPL-MCNC: 14 MG/DL (ref 5–25)
CALCIUM SERPL-MCNC: 9.7 MG/DL (ref 8.4–10.2)
CARDIAC TROPONIN I PNL SERPL HS: 3 NG/L
CHLORIDE SERPL-SCNC: 104 MMOL/L (ref 96–108)
CO2 SERPL-SCNC: 25 MMOL/L (ref 21–32)
CREAT SERPL-MCNC: 0.73 MG/DL (ref 0.6–1.3)
EOSINOPHIL # BLD AUTO: 0.01 THOUSAND/ΜL (ref 0–0.61)
EOSINOPHIL NFR BLD AUTO: 0 % (ref 0–6)
ERYTHROCYTE [DISTWIDTH] IN BLOOD BY AUTOMATED COUNT: 12.6 % (ref 11.6–15.1)
GFR SERPL CREATININE-BSD FRML MDRD: 96 ML/MIN/1.73SQ M
GLUCOSE SERPL-MCNC: 119 MG/DL (ref 65–140)
HCT VFR BLD AUTO: 40.9 % (ref 34.8–46.1)
HGB BLD-MCNC: 13.8 G/DL (ref 11.5–15.4)
IMM GRANULOCYTES # BLD AUTO: 0.01 THOUSAND/UL (ref 0–0.2)
IMM GRANULOCYTES NFR BLD AUTO: 0 % (ref 0–2)
LYMPHOCYTES # BLD AUTO: 1.26 THOUSANDS/ΜL (ref 0.6–4.47)
LYMPHOCYTES NFR BLD AUTO: 23 % (ref 14–44)
MCH RBC QN AUTO: 32.1 PG (ref 26.8–34.3)
MCHC RBC AUTO-ENTMCNC: 33.7 G/DL (ref 31.4–37.4)
MCV RBC AUTO: 95 FL (ref 82–98)
MONOCYTES # BLD AUTO: 0.47 THOUSAND/ΜL (ref 0.17–1.22)
MONOCYTES NFR BLD AUTO: 9 % (ref 4–12)
NEUTROPHILS # BLD AUTO: 3.76 THOUSANDS/ΜL (ref 1.85–7.62)
NEUTS SEG NFR BLD AUTO: 67 % (ref 43–75)
NRBC BLD AUTO-RTO: 0 /100 WBCS
PLATELET # BLD AUTO: 279 THOUSANDS/UL (ref 149–390)
PMV BLD AUTO: 8.6 FL (ref 8.9–12.7)
POTASSIUM SERPL-SCNC: 3.8 MMOL/L (ref 3.5–5.3)
PROT SERPL-MCNC: 7.8 G/DL (ref 6.4–8.4)
RBC # BLD AUTO: 4.3 MILLION/UL (ref 3.81–5.12)
SODIUM SERPL-SCNC: 140 MMOL/L (ref 135–147)
WBC # BLD AUTO: 5.56 THOUSAND/UL (ref 4.31–10.16)

## 2024-11-06 PROCEDURE — 71045 X-RAY EXAM CHEST 1 VIEW: CPT

## 2024-11-06 PROCEDURE — 36415 COLL VENOUS BLD VENIPUNCTURE: CPT | Performed by: EMERGENCY MEDICINE

## 2024-11-06 PROCEDURE — 99285 EMERGENCY DEPT VISIT HI MDM: CPT

## 2024-11-06 PROCEDURE — 93000 ELECTROCARDIOGRAM COMPLETE: CPT | Performed by: STUDENT IN AN ORGANIZED HEALTH CARE EDUCATION/TRAINING PROGRAM

## 2024-11-06 PROCEDURE — 84484 ASSAY OF TROPONIN QUANT: CPT | Performed by: EMERGENCY MEDICINE

## 2024-11-06 PROCEDURE — 93005 ELECTROCARDIOGRAM TRACING: CPT

## 2024-11-06 PROCEDURE — 99213 OFFICE O/P EST LOW 20 MIN: CPT | Performed by: STUDENT IN AN ORGANIZED HEALTH CARE EDUCATION/TRAINING PROGRAM

## 2024-11-06 PROCEDURE — 80053 COMPREHEN METABOLIC PANEL: CPT | Performed by: EMERGENCY MEDICINE

## 2024-11-06 PROCEDURE — 96372 THER/PROPH/DIAG INJ SC/IM: CPT

## 2024-11-06 PROCEDURE — 99024 POSTOP FOLLOW-UP VISIT: CPT | Performed by: STUDENT IN AN ORGANIZED HEALTH CARE EDUCATION/TRAINING PROGRAM

## 2024-11-06 PROCEDURE — 99285 EMERGENCY DEPT VISIT HI MDM: CPT | Performed by: EMERGENCY MEDICINE

## 2024-11-06 PROCEDURE — 85025 COMPLETE CBC W/AUTO DIFF WBC: CPT | Performed by: EMERGENCY MEDICINE

## 2024-11-06 RX ORDER — ACETAMINOPHEN 325 MG/1
975 TABLET ORAL ONCE
Status: COMPLETED | OUTPATIENT
Start: 2024-11-06 | End: 2024-11-06

## 2024-11-06 RX ORDER — KETOROLAC TROMETHAMINE 30 MG/ML
15 INJECTION, SOLUTION INTRAMUSCULAR; INTRAVENOUS ONCE
Status: COMPLETED | OUTPATIENT
Start: 2024-11-06 | End: 2024-11-06

## 2024-11-06 RX ADMIN — ACETAMINOPHEN 975 MG: 325 TABLET ORAL at 15:55

## 2024-11-06 RX ADMIN — KETOROLAC TROMETHAMINE 15 MG: 30 INJECTION, SOLUTION INTRAMUSCULAR at 15:56

## 2024-11-06 NOTE — TELEPHONE ENCOUNTER
Regarding: High BP  ----- Message from Edelmira GONZALES sent at 11/6/2024  1:10 PM EST -----  Spoke with patient, patient had stated her blood pressure has been on the high side today 170/107. A good call back number for the patient is [phone].  Please advise.

## 2024-11-06 NOTE — PROGRESS NOTES
Ambulatory Visit  Name: Moody Garrido      : 1975      MRN: [id number]  Encounter Provider: Katarina Gonsalez MD  Encounter Date: 2024   Encounter department: [facility]    Assessment & Plan  Primary hypertension  -/110, patient diaphoretic, dyspneic, and tachycardic  -Started on milnacipran 12.5 mg BID 2-3 weeks ago for fibromyalgia   Orders:    POCT ECG    -EKG-Rate 92 and no ST elevations in office  -Curbsided Dr. Mao who noted to me that the milnacipran could be contributor to patient's symptoms. Dr. Mao recommended that patient wean off medication to once daily for one week, then completely wean off. Will follow up with patient in office tomorrow.     -Patient sent to ER due to symptoms for appropriate BP control     History of Present Illness     HPI    Patient presents diaphoretic, short of breath, and dyspneic to office today. She notes this started last night. She presents to office very uneasy. She notes she is alternating from having chills to feeling very warm. She has a history of fibromyalgia and was placed on milnacipran 12.5 mg BID a couple weeks ago by pain doctor. No changes in regards to anxiety. Denies caffeine or illicit drug use. No thyroid issues.       Review of Systems   Constitutional:  Positive for activity change, chills and diaphoresis. Negative for fatigue and fever.   HENT:  Negative for congestion, postnasal drip, rhinorrhea and sore throat.    Respiratory:  Positive for chest tightness and shortness of breath. Negative for cough and wheezing.    Cardiovascular:  Positive for palpitations. Negative for chest pain and leg swelling.   Gastrointestinal:  Negative for abdominal pain, constipation, diarrhea, nausea and vomiting.   Musculoskeletal:  Negative for myalgias.   Skin:  Negative for rash.   Neurological:  Negative for weakness, light-headedness and headaches.   Psychiatric/Behavioral:  The patient is not nervous/anxious.             Objective     LMP 08/15/2023 (Approximate) Comment: post menapausal    Physical Exam  Constitutional:       General: She is in acute distress.      Appearance: She is diaphoretic.   HENT:      Head: Normocephalic and atraumatic.   Cardiovascular:      Rate and Rhythm: Regular rhythm. Tachycardia present.      Pulses: Normal pulses.      Heart sounds: Normal heart sounds.   Pulmonary:      Effort: Pulmonary effort is normal.      Breath sounds: Normal breath sounds.   Neurological:      General: No focal deficit present.      Mental Status: She is alert and oriented to person, place, and time.   Psychiatric:         Mood and Affect: Mood normal.         Behavior: Behavior normal.         Thought Content: Thought content normal.         Judgment: Judgment normal.

## 2024-11-06 NOTE — ED PROVIDER NOTES
Time reflects when diagnosis was documented in both MDM as applicable and the Disposition within this note       Time User Action Codes Description Comment    11/6/2024  4:56 PM Omid Davenport Add [I10] Hypertension     11/6/2024  4:56 PM Omid Davenport Add [R07.9] Chest pain           ED Disposition       ED Disposition   Discharge    Condition   Stable    Date/Time   Wed Nov 6, 2024  4:55 PM    Comment   Moody Barrettgins discharge to home/self care.                   Assessment & Plan       Medical Decision Making  Differential diagnosis includes but is not limited to anxiety, pneumonia, musculoskeletal pain, atypical presentation of ACS.  I ordered and reviewed lab work including CBC, CMP, troponin.  I ordered and independently interpreted an EKG which was normal.I ordered and independently interpreted chest x-ray which was normal.  I treated patient with Toradol, acetaminophen.  Well-appearing on reassessment.  Patient with a heart score of 2.  Provided with reassurance, instructions to follow-up with primary care provider for reevaluation and recheck of blood pressure.  Discharged with return precautions.    Amount and/or Complexity of Data Reviewed  Labs: ordered.  Radiology: ordered and independent interpretation performed.    Risk  OTC drugs.  Prescription drug management.             Medications   ketorolac (TORADOL) injection 15 mg (15 mg Intramuscular Given 11/6/24 1556)   acetaminophen (TYLENOL) tablet 975 mg (975 mg Oral Given 11/6/24 1555)       ED Risk Strat Scores   HEART Risk Score      Flowsheet Row Most Recent Value   Heart Score Risk Calculator    History 0 Filed at: 11/06/2024 1655   ECG 0 Filed at: 11/06/2024 1655   Age 1 Filed at: 11/06/2024 1655   Risk Factors 1 Filed at: 11/06/2024 1655   Troponin 0 Filed at: 11/06/2024 1655   HEART Score 2 Filed at: 11/06/2024 1655                               SBIRT 20yo+      Flowsheet Row Most Recent Value   Initial Alcohol Screen: US AUDIT-C      1. How often do you have a drink containing alcohol? 0 Filed at: 11/06/2024 1633   2. How many drinks containing alcohol do you have on a typical day you are drinking?  0 Filed at: 11/06/2024 1633   3a. Male UNDER 65: How often do you have five or more drinks on one occasion? 0 Filed at: 11/06/2024 1633   3b. FEMALE Any Age, or MALE 65+: How often do you have 4 or more drinks on one occassion? 0 Filed at: 11/06/2024 1633   Audit-C Score 0 Filed at: 11/06/2024 1633   BOB: How many times in the past year have you...    Used an illegal drug or used a prescription medication for non-medical reasons? Never Filed at: 11/06/2024 1633                            History of Present Illness       Chief Complaint   Patient presents with    High Blood Pressure     Pt arrives via EMS from PCP. States meds for fibromyalgia were changed a few weeks ago and she has been feeling shaky and recording high bps. Denies being on blood pressure meds. Appears extremely anxious. States she usually drinks a small amount of alc daily and had a drink at lunch thinking it would help.       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    Vaginosis       Past Surgical History:   Procedure Laterality Date    ABDOMINAL SURGERY      second surgery for endometriosis    BACK SURGERY      CHOLECYSTECTOMY      LAPAROSCOPIC ENDOMETRIOSIS FULGURATION      TX CORRECTION HAMMERTOE Left 9/24/2024    Procedure: REPAIR HAMMERTOE third digit;  Surgeon: Lalo Ramsey DPM;  Location: [facility];  Service: Podiatry    TX OSTEOT W/WO LNGTH SHRT/CORRJ METAR XCP 1ST EA Left 9/24/2024    Procedure: Weil osteotomy third metatarsal;  Surgeon: Lalo Ramsey DPM;   Location: [facility];  Service: Podiatry      Family History   Problem Relation Age of Onset    Heart disease Mother     Diabetes Mother     Heart disease Father     Diabetes Father       Social History     Tobacco Use    Smoking status: Never     Passive exposure: Past    Smokeless tobacco: Never   Vaping Use    Vaping status: Never Used   Substance Use Topics    Alcohol use: Yes     Alcohol/week: 7.0 standard drinks of alcohol     Types: 7 Glasses of wine per week     Comment: daily    Drug use: Yes     Types: Marijuana     Comment: daily      E-Cigarette/Vaping    E-Cigarette Use Never User       E-Cigarette/Vaping Substances    Nicotine No     THC No     CBD No     Flavoring No     Other No     Unknown No       I have reviewed and agree with the history as documented.     Patient is a 48-year-old female presenting for evaluation of 3 days of central chest pain, shortness of breath, episodes of diaphoresis, high blood pressure.  Patient states the episodes occur at random, typically at rest.  Patient states that the pain is mild.  Patient states that is currently present.  Patient denies exertional component, denies associated nausea, vomiting, syncope or near syncope.  Patient does state some occasional palpitations associated with the symptoms.  Patient denies pleuritic component to the pain.  Patient denies rhinorrhea, sore throat, cough, does complain of a mild headache.  Patient denies any prior similar episodes.  Patient denies prior cardiac history however states that her sister had a heart attack in her 50s.        Review of Systems   Constitutional:  Positive for diaphoresis. Negative for chills, fatigue and fever.   Respiratory:  Positive for shortness of breath.    Cardiovascular:  Positive for chest pain.   Gastrointestinal:  Negative for diarrhea, nausea and vomiting.   Musculoskeletal:  Negative for arthralgias and myalgias.   Neurological:  Positive for headaches.   Psychiatric/Behavioral:   Negative for confusion.    All other systems reviewed and are negative.          Objective       ED Triage Vitals   Temperature Pulse Blood Pressure Respirations SpO2 Patient Position - Orthostatic VS   11/06/24 1511 11/06/24 1511 11/06/24 1511 11/06/24 1511 11/06/24 1511 11/06/24 1511   99.5 °F (37.5 °C) 89 149/89 18 98 % Lying      Temp Source Heart Rate Source BP Location FiO2 (%) Pain Score    11/06/24 1511 11/06/24 1511 11/06/24 1511 -- 11/06/24 1555    Oral Monitor Left arm  5      Vitals      Date and Time Temp Pulse SpO2 Resp BP Pain Score FACES Pain Rating User   11/06/24 1555 -- -- -- -- -- 5 -- MD   11/06/24 1511 99.5 °F (37.5 °C) 89 98 % 18 149/89 -- -- VERONICA            Physical Exam  Vitals and nursing note reviewed.   Constitutional:       General: She is not in acute distress.     Appearance: Normal appearance. She is not ill-appearing, toxic-appearing or diaphoretic.      Comments: Anxious appearing but nontoxic nondistressed   HENT:      Head: Normocephalic and atraumatic.      Comments: Moist mucous membranes     Right Ear: External ear normal.      Left Ear: External ear normal.   Eyes:      General:         Right eye: No discharge.         Left eye: No discharge.   Cardiovascular:      Comments: Regular rate and rhythm, no murmurs rubs or gallops.  Extremities warm and well-perfused without mottling  Pulmonary:      Effort: No respiratory distress.      Comments: No increased work of breathing.  Speaking in complete sentences.  Lungs clear to auscultation bilaterally without wheezes, rales, rhonchi.  Satting 98% on room air indicating adequate oxygenation  Abdominal:      General: There is no distension.      Comments: Abdomen soft, nontender, nondistended without rigidity, rebound, guarding   Musculoskeletal:         General: No deformity.      Cervical back: Normal range of motion.      Comments: No lower extremity swelling, erythema, or calf tenderness   Skin:     Findings: No lesion or rash.    Neurological:      Mental Status: She is alert and oriented to person, place, and time. Mental status is at baseline.      Comments: Awake, alert, pleasant, interactive   Psychiatric:         Mood and Affect: Mood and affect normal.         Results Reviewed       Procedure Component Value Units Date/Time    HS Troponin I 2hr [[id number]]     Lab Status: No result Specimen: Blood     HS Troponin 0hr (reflex protocol) [[id number]]  (Normal) Collected: 11/06/24 1620    Lab Status: Final result Specimen: Blood from Arm, Left Updated: 11/06/24 1649     hs TnI 0hr 3 ng/L     Comprehensive metabolic panel [[id number]] Collected: 11/06/24 1620    Lab Status: Final result Specimen: Blood from Arm, Left Updated: 11/06/24 1641     Sodium 140 mmol/L      Potassium 3.8 mmol/L      Chloride 104 mmol/L      CO2 25 mmol/L      ANION GAP 11 mmol/L      BUN 14 mg/dL      Creatinine 0.73 mg/dL      Glucose 119 mg/dL      Calcium 9.7 mg/dL      AST 25 U/L      ALT 21 U/L      Alkaline Phosphatase 51 U/L      Total Protein 7.8 g/dL      Albumin 4.6 g/dL      Total Bilirubin 0.96 mg/dL      eGFR 96 ml/min/1.73sq m     Narrative:      National Kid[employer] Disease Foundation guidelines for Chronic Kidney Disease (CKD):     Stage 1 with normal or high GFR (GFR > 90 mL/min/1.73 square meters)    Stage 2 Mild CKD (GFR = 60-89 mL/min/1.73 square meters)    Stage 3A Moderate CKD (GFR = 45-59 mL/min/1.73 square meters)    Stage 3B Moderate CKD (GFR = 30-44 mL/min/1.73 square meters)    Stage 4 Severe CKD (GFR = 15-29 mL/min/1.73 square meters)    Stage 5 End Stage CKD (GFR <15 mL/min/1.73 square meters)  Note: GFR calculation is accurate only with a steady state creatinine    CBC and differential [[id number]]  (Abnormal) Collected: 11/06/24 1620    Lab Status: Final result Specimen: Blood from Arm, Left Updated: 11/06/24 1625     WBC 5.56 Thousand/uL      RBC 4.30 Million/uL      Hemoglobin 13.8 g/dL      Hematocrit 40.9 %      MCV 95 fL      MCH 32.1  pg      MCHC 33.7 g/dL      RDW 12.6 %      MPV 8.6 fL      Platelets 279 Thousands/uL      nRBC 0 /100 WBCs      Segmented % 67 %      Immature Grans % 0 %      Lymphocytes % 23 %      Monocytes % 9 %      Eosinophils Relative 0 %      Basophils Relative 1 %      Absolute Neutrophils 3.76 Thousands/µL      Absolute Immature Grans 0.01 Thousand/uL      Absolute Lymphocytes 1.26 Thousands/µL      Absolute Monocytes 0.47 Thousand/µL      Eosinophils Absolute 0.01 Thousand/µL      Basophils Absolute 0.05 Thousands/µL             XR chest 1 view portable   ED Interpretation by Omid Davenport MD (11/06 1655)   No acute cardiopulmonary abnormality          ECG 12 Lead Documentation Only    Date/Time: 11/6/2024 5:01 PM    Performed by: Omid Davenport MD  Authorized by: Omid Davenport MD    Indications / Diagnosis:  Chest pain  ECG reviewed by me, the ED Provider: yes    Patient location:  ED  Interpretation:     Interpretation: normal    Rate:     ECG rate:  81    ECG rate assessment: normal    Rhythm:     Rhythm: sinus rhythm    Ectopy:     Ectopy: none    QRS:     QRS axis:  Normal    QRS intervals:  Normal  Conduction:     Conduction: normal    ST segments:     ST segments:  Normal  T waves:     T waves: normal        ED Medication and Procedure Management   Prior to Admission Medications   Prescriptions Last Dose Informant Patient Reported? Taking?   Milnacipran HCl 12.5 MG TABS  Self No No   Sig: Take 1 tablet (12.5 mg total) by mouth 2 (two) times a day Start with one tablet nightly for 7 days and the increase to twice a day   fluconazole (DIFLUCAN) 150 mg tablet  Self Yes No   meloxicam (MOBIC) 15 mg tablet  Self No No   Sig: TAKE 1 TABLET(15 MG) BY MOUTH DAILY   methocarbamol (Robaxin-750) 750 mg tablet  Self No No   Sig: Take 1 tablet (750 mg total) by mouth 3 (three) times a day as needed for muscle spasms   omeprazole (PriLOSEC) 40 MG capsule  Self Yes No   Sig: Take 40 mg by mouth daily    phenazopyridine (PYRIDIUM) 95 MG tablet  Self Yes No   Sig: Take 190 mg by mouth 3 (three) times a day as needed for bladder spasms   sucralfate (CARAFATE) 1 g tablet  Self Yes No   Sig: Take 1 g by mouth 4 (four) times a day      Facility-Administered Medications: None     Patient's Medications   Discharge Prescriptions    No medications on file     No discharge procedures on file.  ED SEPSIS DOCUMENTATION   Time reflects when diagnosis was documented in both MDM as applicable and the Disposition within this note       Time User Action Codes Description Comment    11/6/2024  4:56 PM Omid Davenport [I10] Hypertension     11/6/2024  4:56 PM Omid Davenport [R07.9] Chest pain                  Omid Davenport MD  11/06/24 3033

## 2024-11-06 NOTE — PROGRESS NOTES
"[facility] Podiatric Medicine and Surgery Office Visit    ASSESSMENT     Diagnoses and all orders for this visit:    Hammertoe of left foot    Metatarsal deformity, left    Post-operative state             Problem List Items Addressed This Visit    None  Visit Diagnoses       Hammertoe of left foot    -  Primary    Metatarsal deformity, left        Post-operative state                  PLAN  -Moody and I discussed her left foot  -She may transition slowly into supportive sneakers at this time  -Return to clinic 2 weeks, will obtain final postoperative x-rays at this time    SUBJECTIVE    Chief Complaint:  Status post left Weil osteotomy with hammertoe repair third digit     Patient ID: Moody Garrido     11/6/2024: Moody is doing well today. She states since her last visit when the pin was removed she has been in a little more pain than normal especially at the end of the day and at night.  She states that she has been wearing her surgical shoe at all times as instructed.      The following portions of the patient's history were reviewed and updated as appropriate: allergies, current medications, past family history, past medical history, past social history, past surgical history and problem list.    Review of Systems   Constitutional: Negative.    HENT: Negative.     Respiratory: Negative.     Cardiovascular: Negative.    Gastrointestinal: Negative.    Musculoskeletal: Negative.    Skin: Negative.    Neurological: Negative.          OBJECTIVE      BP (!) 178/107   Pulse 80   Ht 5' 8\" (1.727 m)   Wt 86.2 kg (190 lb)   LMP 08/15/2023 (Approximate) Comment: post menapausal  BMI 28.89 kg/m²        Physical Exam  Constitutional:       Appearance: Normal appearance.   HENT:      Head: Normocephalic and atraumatic.   Eyes:      General:         Right eye: No discharge.         Left eye: No discharge.   Cardiovascular:      Rate and Rhythm: Normal rate and regular rhythm.      Pulses:           Dorsalis pedis " pulses are 2+ on the right side and 2+ on the left side.        Posterior tibial pulses are 2+ on the right side and 2+ on the left side.   Pulmonary:      Effort: Pulmonary effort is normal.      Breath sounds: Normal breath sounds.   Skin:     General: Skin is warm.      Capillary Refill: Capillary refill takes less than 2 seconds.   Neurological:      Sensory: Sensation is intact. No sensory deficit.         Vascular:  -DP and PT pulses intact b/l  -Capillary refill time <2 sec b/l  -Digital hair growth: Present  -Skin temp: WNL    MSK:  -Pain on palpation to the dorsal aspect of the third digit and third metatarsal head  -No gross deformities noted  -MMT is 5/5 to all muscle compartments of the lower extremity  -Ankle dorsiflexion >10 degrees with knee extended and knee flexed b/l  -Left third digit remains in rectus position    Neuro:  -Light sensation intact bilaterally  -Protective sensation intact bilaterally    Derm:  -No lesions, abrasions, or open wounds noted  -No noted interdigital maceration, peeling, malodor  -No callus formation noted on exam  -All incision sites remain fully healed at this time without complication.

## 2024-11-06 NOTE — ASSESSMENT & PLAN NOTE
-/110, patient diaphoretic, dyspneic, and tachycardic  -Started on milnacipran 12.5 mg BID 2-3 weeks ago for fibromyalgia   Orders:    POCT ECG    -EKG-Rate 92 and no ST elevations in office  -Curbsided Dr. Moa who noted to me that the milnacipran could be contributor to patient's symptoms. Dr. Mao recommended that patient wean off medication to once daily for one week, then completely wean off. Will follow up with patient in office tomorrow.     -Patient sent to ER due to symptoms for appropriate BP control

## 2024-11-06 NOTE — DISCHARGE INSTRUCTIONS
We have performed an evaluation of your chest pain in the emergency department and determined that you can be safely discharged home. We have provided you with general information about chest pain in adults. We recommend that you follow up with your primary care provider in the next 5-7 days for further evaluationIf your chest pain changes, worsens, if you have significant associated shortness of breath, palpitations, diaphoresis, persistent nausea and vomiting, or if you pass out, return to the emergency department immediately.  And recheck of your blood pressure.  We have additionally provided information to follow-up with cardiology in the next month if you so desire.

## 2024-11-06 NOTE — TELEPHONE ENCOUNTER
Called patient. She is already at the office for an appointment . No triage needed   Reason for Disposition   Negative: Did you page the on call provider?    Protocols used: DENIS NO TRIAGE REQUIRED

## 2024-11-07 ENCOUNTER — TELEMEDICINE (OUTPATIENT)
Dept: FAMILY MEDICINE CLINIC | Facility: CLINIC | Age: 49
End: 2024-11-07
Payer: COMMERCIAL

## 2024-11-07 DIAGNOSIS — I10 PRIMARY HYPERTENSION: Primary | ICD-10-CM

## 2024-11-07 LAB
ATRIAL RATE: 81 BPM
P AXIS: 40 DEGREES
PR INTERVAL: 174 MS
QRS AXIS: 55 DEGREES
QRSD INTERVAL: 86 MS
QT INTERVAL: 386 MS
QTC INTERVAL: 448 MS
T WAVE AXIS: 39 DEGREES
VENTRICULAR RATE: 81 BPM

## 2024-11-07 PROCEDURE — 93010 ELECTROCARDIOGRAM REPORT: CPT | Performed by: INTERNAL MEDICINE

## 2024-11-07 PROCEDURE — 99213 OFFICE O/P EST LOW 20 MIN: CPT | Performed by: STUDENT IN AN ORGANIZED HEALTH CARE EDUCATION/TRAINING PROGRAM

## 2024-11-07 PROCEDURE — G2211 COMPLEX E/M VISIT ADD ON: HCPCS | Performed by: STUDENT IN AN ORGANIZED HEALTH CARE EDUCATION/TRAINING PROGRAM

## 2024-11-07 RX ORDER — RAMIPRIL 10 MG/1
10 CAPSULE ORAL DAILY
Qty: 30 CAPSULE | Refills: 0 | Status: SHIPPED | OUTPATIENT
Start: 2024-11-07

## 2024-11-07 NOTE — PROGRESS NOTES
----- Message from Yina Roth MD sent at 10/22/2018 11:36 AM CDT -----  Please inform pt of normal lab results, except cholesterol slightly high - please continue walking and diet and we will recheck in 6 months.     Virtual Brief Visit  Name: Moody Garrido      : 1975      MRN: [id number]  Encounter Provider: Katarina Gonsalez MD  Encounter Date: 2024   Encounter department: [facility]    This Visit is being completed by telephone. The Patient is located at Home and in the following state in which I hold an active license [place]    The patient was identified by name and date of birth. Moody Garrido was informed that this is a telemedicine visit and that the visit is being conducted through the [employer] platform. She agrees to proceed..  My office door was closed. No one else was in the room.  She acknowledged consent and understanding of privacy and security of the video platform. The patient has agreed to participate and understands they can discontinue the visit at any time.    Patient is aware this is a billable service.     Assessment & Plan  Primary hypertension  -BP at home 156/96  Orders:    ramipril (ALTACE) 10 MG capsule; Take 1 capsule (10 mg total) by mouth daily  -Denies chest pain, SOB, and dizziness  -Discussed with patient that milnacipran could be contributing to symptoms. Patient should decrease this medication to once daily. Follow up with pain specialist.        History of Present Illness   HPI    Patient presents for follow up after being sent to ER yesterday during her visit for high BP and palpitations. She was placed on milnacipran about three weeks ago and notes that is the only recent change in medication. She notes her sister  of a heart attack in her 50s. She is feeling a lot better today but notes her BP readings at home continue to be elevated. Denies chest pain, SOB, dizziness and headache.     Visit Time  Total Visit Duration: 20             3

## 2024-11-07 NOTE — ASSESSMENT & PLAN NOTE
-BP at home 156/96  Orders:    ramipril (ALTACE) 10 MG capsule; Take 1 capsule (10 mg total) by mouth daily  -Denies chest pain, SOB, and dizziness  -Discussed with patient that milnacipran could be contributing to symptoms. Patient should decrease this medication to once daily. Follow up with pain specialist.

## 2024-11-08 ENCOUNTER — TELEMEDICINE (OUTPATIENT)
Dept: FAMILY MEDICINE CLINIC | Facility: CLINIC | Age: 49
End: 2024-11-08
Payer: COMMERCIAL

## 2024-11-08 ENCOUNTER — TELEPHONE (OUTPATIENT)
Age: 49
End: 2024-11-08

## 2024-11-08 VITALS — HEIGHT: 68 IN | BODY MASS INDEX: 28.79 KG/M2 | WEIGHT: 190 LBS

## 2024-11-08 DIAGNOSIS — M48.07 SPINAL STENOSIS OF LUMBOSACRAL REGION: ICD-10-CM

## 2024-11-08 DIAGNOSIS — M79.7 FIBROMYALGIA: ICD-10-CM

## 2024-11-08 DIAGNOSIS — M96.1 FAILED BACK SYNDROME: ICD-10-CM

## 2024-11-08 DIAGNOSIS — R39.9 UTI SYMPTOMS: ICD-10-CM

## 2024-11-08 DIAGNOSIS — N30.00 ACUTE CYSTITIS WITHOUT HEMATURIA: Primary | ICD-10-CM

## 2024-11-08 LAB
SL AMB  POCT GLUCOSE, UA: NORMAL
SL AMB LEUKOCYTE ESTERASE,UA: NEGATIVE
SL AMB POCT BILIRUBIN,UA: 3
SL AMB POCT BLOOD,UA: ABNORMAL
SL AMB POCT CLARITY,UA: ABNORMAL
SL AMB POCT COLOR,UA: ABNORMAL
SL AMB POCT KETONES,UA: NEGATIVE
SL AMB POCT NITRITE,UA: POSITIVE
SL AMB POCT PH,UA: 5
SL AMB POCT SPECIFIC GRAVITY,UA: 1.01
SL AMB POCT URINE PROTEIN: 500
SL AMB POCT UROBILINOGEN: >12

## 2024-11-08 PROCEDURE — 99213 OFFICE O/P EST LOW 20 MIN: CPT | Performed by: NURSE PRACTITIONER

## 2024-11-08 PROCEDURE — G2211 COMPLEX E/M VISIT ADD ON: HCPCS | Performed by: NURSE PRACTITIONER

## 2024-11-08 PROCEDURE — 81003 URINALYSIS AUTO W/O SCOPE: CPT | Performed by: NURSE PRACTITIONER

## 2024-11-08 PROCEDURE — 87077 CULTURE AEROBIC IDENTIFY: CPT | Performed by: NURSE PRACTITIONER

## 2024-11-08 PROCEDURE — 87186 SC STD MICRODIL/AGAR DIL: CPT | Performed by: NURSE PRACTITIONER

## 2024-11-08 PROCEDURE — 87086 URINE CULTURE/COLONY COUNT: CPT | Performed by: NURSE PRACTITIONER

## 2024-11-08 RX ORDER — MILNACIPRAN HCL 12.5 MG
TABLET ORAL
Qty: 60 TABLET | Refills: 0 | Status: SHIPPED | OUTPATIENT
Start: 2024-11-08

## 2024-11-08 RX ORDER — CIPROFLOXACIN 500 MG/1
500 TABLET, FILM COATED ORAL EVERY 12 HOURS SCHEDULED
Qty: 14 TABLET | Refills: 0 | Status: SHIPPED | OUTPATIENT
Start: 2024-11-08 | End: 2024-11-15

## 2024-11-08 NOTE — TELEPHONE ENCOUNTER
Pt called in stating she has a UTI. Spoke with Celina in the office and she will contact the pt to let her know if she can come in and give them a urine sample.

## 2024-11-08 NOTE — PROGRESS NOTES
Virtual Regular Visit  Name: Moody Garrido      : 1975      MRN: [id number]  Encounter Provider: MICKY Pennington  Encounter Date: 2024   Encounter department: [facility]    Verification of patient location:    Patient is located at Home in the following state in which I hold an active license [place]    Assessment & Plan  Acute cystitis without hematuria    Orders:    ciprofloxacin (CIPRO) 500 mg tablet; Take 1 tablet (500 mg total) by mouth every 12 (twelve) hours for 7 days    UTI symptoms    Orders:    POCT urine dip auto non-scope    Urine culture    ciprofloxacin (CIPRO) 500 mg tablet; Take 1 tablet (500 mg total) by mouth every 12 (twelve) hours for 7 days         Encounter provider MICKY Pennington    The patient was identified by name and date of birth. Moody Garrido was informed that this is a telemedicine visit and that the visit is being conducted through the [employer] platform. She agrees to proceed..  My office door was closed. No one else was in the room.  She acknowledged consent and understanding of privacy and security of the video platform. The patient has agreed to participate and understands they can discontinue the visit at any time.    Patient is aware this is a billable service.     History of Present Illness     Urinary Tract Infection   This is a new problem. The current episode started yesterday. The problem has been unchanged. The quality of the pain is described as burning. The patient is experiencing no pain. There has been no fever. Associated symptoms include frequency and urgency. Pertinent negatives include no chills or flank pain. She has tried nothing for the symptoms. The treatment provided no relief. Her past medical history is significant for recurrent UTIs.       History obtained from : patient  Review of Systems   Constitutional:  Negative for chills and fever.   Genitourinary:  Positive for dysuria, frequency and  "urgency. Negative for flank pain and pelvic pain.        Malodorous urine      Current Outpatient Medications on File Prior to Visit   Medication Sig Dispense Refill    fluconazole (DIFLUCAN) 150 mg tablet       meloxicam (MOBIC) 15 mg tablet TAKE 1 TABLET(15 MG) BY MOUTH DAILY 30 tablet 5    methocarbamol (Robaxin-750) 750 mg tablet Take 1 tablet (750 mg total) by mouth 3 (three) times a day as needed for muscle spasms 90 tablet 0    omeprazole (PriLOSEC) 40 MG capsule Take 40 mg by mouth daily      phenazopyridine (PYRIDIUM) 95 MG tablet Take 190 mg by mouth 3 (three) times a day as needed for bladder spasms      ramipril (ALTACE) 10 MG capsule Take 1 capsule (10 mg total) by mouth daily 30 capsule 0    Savella 12.5 MG TABS TAKE 1 TABLET(12.5 MG) BY MOUTH TWICE DAILY. START WITH 1 TABLET EVERY NIGHT FOR 7 DAYS AND THE. INCREASE TO 2 TIMES A DAY 60 tablet 0    sucralfate (CARAFATE) 1 g tablet Take 1 g by mouth 4 (four) times a day      [DISCONTINUED] Milnacipran HCl 12.5 MG TABS Take 1 tablet (12.5 mg total) by mouth 2 (two) times a day Start with one tablet nightly for 7 days and the increase to twice a day 60 tablet 0     No current facility-administered medications on file prior to visit.          Objective     Ht 5' 8\" (1.727 m)   Wt 86.2 kg (190 lb)   LMP 08/15/2023 (Approximate) Comment: post menapausal  BMI 28.89 kg/m²   Physical Exam  Vitals reviewed.   Constitutional:       Appearance: Normal appearance.   HENT:      Head: Normocephalic and atraumatic.   Cardiovascular:      Rate and Rhythm: Normal rate and regular rhythm.      Heart sounds: Normal heart sounds.   Pulmonary:      Breath sounds: Normal breath sounds.   Neurological:      Mental Status: She is alert and oriented to person, place, and time.   Psychiatric:         Mood and Affect: Mood normal.         Visit Time  Total Visit Duration: 15 minutes        "

## 2024-11-10 LAB — BACTERIA UR CULT: ABNORMAL

## 2024-11-11 ENCOUNTER — OFFICE VISIT (OUTPATIENT)
Dept: PAIN MEDICINE | Facility: CLINIC | Age: 49
End: 2024-11-11
Payer: COMMERCIAL

## 2024-11-11 VITALS — BODY MASS INDEX: 29.55 KG/M2 | HEIGHT: 68 IN | WEIGHT: 195 LBS

## 2024-11-11 DIAGNOSIS — G89.4 CHRONIC PAIN SYNDROME: ICD-10-CM

## 2024-11-11 DIAGNOSIS — M54.16 RADICULOPATHY, LUMBAR REGION: Primary | ICD-10-CM

## 2024-11-11 DIAGNOSIS — M47.817 SPONDYLOSIS WITHOUT MYELOPATHY OR RADICULOPATHY, LUMBOSACRAL REGION: ICD-10-CM

## 2024-11-11 DIAGNOSIS — M46.1 SACROILIITIS, NOT ELSEWHERE CLASSIFIED (HCC): Chronic | ICD-10-CM

## 2024-11-11 DIAGNOSIS — M96.1 POSTLAMINECTOMY SYNDROME: ICD-10-CM

## 2024-11-11 PROCEDURE — 99214 OFFICE O/P EST MOD 30 MIN: CPT | Performed by: STUDENT IN AN ORGANIZED HEALTH CARE EDUCATION/TRAINING PROGRAM

## 2024-11-11 RX ORDER — CYCLOBENZAPRINE HCL 10 MG
10 TABLET ORAL 3 TIMES DAILY PRN
Qty: 90 TABLET | Refills: 0 | Status: SHIPPED | OUTPATIENT
Start: 2024-11-11 | End: 2024-12-11

## 2024-11-11 RX ORDER — GABAPENTIN 300 MG/1
300 CAPSULE ORAL 2 TIMES DAILY
Qty: 60 CAPSULE | Refills: 0 | Status: SHIPPED | OUTPATIENT
Start: 2024-11-11 | End: 2024-12-11

## 2024-11-11 NOTE — PROGRESS NOTES
Pain Medicine Follow-Up Note    Assessment:  1. Radiculopathy, lumbar region    2. Chronic pain syndrome    3. Spondylosis without myelopathy or radiculopathy, lumbosacral region    4. Sacroiliitis, not elsewhere classified (HCC)    5. Postlaminectomy syndrome      Patient is a 48-year-old woman who presents as a follow-up visit after last being seen on 10/10/2024 for which she was started on Savella along with Robaxin.  Patient was previously on this regimen but unfortunately had a reaction to it.  Patient had to present to the ED with tachycardia.  Patient symptoms are worse rating her pain a 9 out of 10 on numeric rating scale.  The pain is worse throughout the day and the pain is constant.  The quality pain is sharp, throbbing, shooting, numbing, pins-and-needles primarily in her bilateral legs.  Of note patient does have a history of a fusion from L3-S1 and has underwent SCS therapy in the past.    Given patient's reaction to Savella will rotate to other neuropathic agent. She states she trialed gabapentin years ago and is open to retrialing. Additionally she has started physical therapy. Lastly, with her receiving no benefit from her muscle relaxant will rotate to flexeril which she has received benefit from in the past.   Plan:  Start gabapentin 300 mg BID   Start flexeril 10 mg TID   Continue Physical therapy   Follow up in six weeks for CT of lumbar spine to further evaluate, can consider epidural therapy which patient does report benefit from in the past.   No orders of the defined types were placed in this encounter.      New Medications Ordered This Visit   Medications    gabapentin (NEURONTIN) 300 mg capsule     Sig: Take 1 capsule (300 mg total) by mouth 2 (two) times a day Start with one tablet nightly for 5 nights and then increase to twice daily from there.     Dispense:  60 capsule     Refill:  0    cyclobenzaprine (FLEXERIL) 10 mg tablet     Sig: Take 1 tablet (10 mg total) by mouth 3 (three)  times a day as needed for muscle spasms     Dispense:  90 tablet     Refill:  0       My impressions and treatment recommendations were discussed in detail with the patient who verbalized understanding and had no further questions.        Complete risks and benefits including bleeding, infection, tissue reaction, nerve injury and allergic reaction were discussed. The approach was demonstrated using models and literature was provided. Verbal and written consent was obtained.      Follow-up is planned in six weeks time or sooner as warranted.  Discharge instructions were provided. I personally saw and examined the patient and I agree with the above discussed plan of care.    History of Present Illness:    Moody Garrido is a 48 y.o. female who presents to [facility] for interval re-evaluation of the above stated pain complaints. The patient has a past medical and chronic pain history as outlined in the assessment section. She was last seen on 10/10/2024.    Patient is a 48-year-old woman who presents as a follow-up visit after last being seen on 10/10/2024 for which she was started on Savella along with Robaxin.  Patient was previously on this regimen but unfortunately had a reaction to it.  Patient had to present to the ED with tachycardia.  Patient symptoms are worse rating her pain a 9 out of 10 on numeric rating scale.  The pain is worse throughout the day and the pain is constant.  The quality pain is sharp, throbbing, shooting, numbing, pins-and-needles primarily in her bilateral legs.  Of note patient does have a history of a fusion from L3-S1 and has underwent SCS therapy in the past.      Other than as stated above, the patient denies any interval changes in medications, medical condition, mental condition, symptoms, or allergies since the last office visit.         Review of Systems:    Review of Systems   Musculoskeletal:  Positive for back pain, gait problem and joint swelling.         Decreased ROM, joint and muscle pain   Neurological:  Positive for weakness and numbness.   Psychiatric/Behavioral:  Positive for dysphoric mood and sleep disturbance. The patient is nervous/anxious.          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    Vaginosis        Past Surgical History:   Procedure Laterality Date    ABDOMINAL SURGERY      second surgery for endometriosis    BACK SURGERY      CHOLECYSTECTOMY      LAPAROSCOPIC ENDOMETRIOSIS FULGURATION      NE CORRECTION HAMMERTOE Left 9/24/2024    Procedure: REPAIR HAMMERTOE third digit;  Surgeon: Lalo Ramsey DPM;  Location: [facility];  Service: Podiatry    NE OSTEOT W/WO LNGTH SHRT/CORRJ METAR XCP 1ST EA Left 9/24/2024    Procedure: Weil osteotomy third metatarsal;  Surgeon: Lalo Ramsey DPM;  Location: [facility];  Service: Podiatry       Family History   Problem Relation Age of Onset    Heart disease Mother     Diabetes Mother     Heart disease Father     Diabetes Father        Social History     Occupational History    Not on file   Tobacco Use    Smoking status: Never     Passive exposure: Past    Smokeless tobacco: Never   Vaping Use    Vaping status: Never Used   Substance and Sexual Activity    Alcohol use: Yes     Alcohol/week: 7.0 standard drinks of alcohol     Types: 7 Glasses of wine per week     Comment: daily    Drug use: Yes     Types: Marijuana     Comment: daily    Sexual activity: Not on file         Current Outpatient Medications:     ciprofloxacin (CIPRO) 500 mg tablet, Take 1 tablet (500 mg total) by mouth every 12 (twelve) hours for 7 days, Disp: 14 tablet, Rfl: 0    cyclobenzaprine (FLEXERIL) 10 mg tablet, Take 1 tablet (10  "mg total) by mouth 3 (three) times a day as needed for muscle spasms, Disp: 90 tablet, Rfl: 0    fluconazole (DIFLUCAN) 150 mg tablet, , Disp: , Rfl:     gabapentin (NEURONTIN) 300 mg capsule, Take 1 capsule (300 mg total) by mouth 2 (two) times a day Start with one tablet nightly for 5 nights and then increase to twice daily from there., Disp: 60 capsule, Rfl: 0    meloxicam (MOBIC) 15 mg tablet, TAKE 1 TABLET(15 MG) BY MOUTH DAILY, Disp: 30 tablet, Rfl: 5    omeprazole (PriLOSEC) 40 MG capsule, Take 40 mg by mouth daily, Disp: , Rfl:     phenazopyridine (PYRIDIUM) 95 MG tablet, Take 190 mg by mouth 3 (three) times a day as needed for bladder spasms, Disp: , Rfl:     ramipril (ALTACE) 10 MG capsule, Take 1 capsule (10 mg total) by mouth daily, Disp: 30 capsule, Rfl: 0    sucralfate (CARAFATE) 1 g tablet, Take 1 g by mouth 4 (four) times a day, Disp: , Rfl:     Savella 12.5 MG TABS, TAKE 1 TABLET(12.5 MG) BY MOUTH TWICE DAILY. START WITH 1 TABLET EVERY NIGHT FOR 7 DAYS AND THE. INCREASE TO 2 TIMES A DAY (Patient not taking: Reported on 11/11/2024), Disp: 60 tablet, Rfl: 0    No Known Allergies    Physical Exam:    Ht 5' 8\" (1.727 m)   Wt 88.5 kg (195 lb)   LMP 08/15/2023 (Approximate) Comment: post menapausal  BMI 29.65 kg/m²     Constitutional:normal, well developed, well nourished, alert, in no distress and non-toxic and no overt pain behavior.  Eyes:anicteric  HEENT:grossly intact  Neck:supple, symmetric, trachea midline and no masses   Pulmonary:even and unlabored  Cardiovascular:No edema or pitting edema present  Skin:Normal without rashes or lesions and well hydrated  Psychiatric:Mood and affect appropriate  Neurologic:Cranial Nerves II-XII grossly intact  Musculoskeletal:normal gait.       Imaging  LUMBAR SPINE     INDICATION:   Postlaminectomy syndrome, not elsewhere classified. Spinal stenosis, lumbosacral region.      COMPARISON:  None.     VIEWS:  XR SPINE LUMBAR 2 OR 3 VIEWS INJURY  Images: 3   "   FINDINGS:     There are 5 non rib bearing lumbar vertebral bodies.      There is no evidence of acute fracture or destructive osseous lesion.     Straightening normal lumbar lordosis without listhesis.     L2-L5 posterior instrumented fusion and decompression without hardware complication. Bilateral sacroiliac joint fusion. Disc height loss L1-2 and L5-S1.     The pedicles appear intact.     Soft tissues are unremarkable.     IMPRESSION:        No acute osseous abnormality.       Postsurgical and degenerative changes as described.     No orders to display         No orders of the defined types were placed in this encounter.

## 2024-11-12 ENCOUNTER — OFFICE VISIT (OUTPATIENT)
Dept: PHYSICAL THERAPY | Facility: CLINIC | Age: 49
End: 2024-11-12
Payer: COMMERCIAL

## 2024-11-12 DIAGNOSIS — M96.1 FAILED BACK SYNDROME: ICD-10-CM

## 2024-11-12 DIAGNOSIS — G89.29 CHRONIC LEFT-SIDED LOW BACK PAIN, UNSPECIFIED WHETHER SCIATICA PRESENT: ICD-10-CM

## 2024-11-12 DIAGNOSIS — M54.16 LUMBAR RADICULOPATHY: ICD-10-CM

## 2024-11-12 DIAGNOSIS — M48.07 SPINAL STENOSIS OF LUMBOSACRAL REGION: ICD-10-CM

## 2024-11-12 DIAGNOSIS — M54.50 CHRONIC LEFT-SIDED LOW BACK PAIN, UNSPECIFIED WHETHER SCIATICA PRESENT: ICD-10-CM

## 2024-11-12 DIAGNOSIS — M79.7 FIBROMYALGIA: Primary | ICD-10-CM

## 2024-11-12 PROCEDURE — 97110 THERAPEUTIC EXERCISES: CPT

## 2024-11-12 PROCEDURE — 97112 NEUROMUSCULAR REEDUCATION: CPT

## 2024-11-12 NOTE — PROGRESS NOTES
Daily Note     Today's date: 2024  Patient name: Moody Garrido  : 1975  MRN: [id number]  Referring provider: Prabhakar Mao MD  Dx:   Encounter Diagnosis     ICD-10-CM    1. Fibromyalgia  M79.7       2. Chronic left-sided low back pain, unspecified whether sciatica present  M54.50     G89.29       3. Failed back syndrome  M96.1       4. Spinal stenosis of lumbosacral region  M48.07       5. Lumbar radiculopathy  M54.16                      Subjective: Patient reports that she has numbness and tingling going in to today's session on the L leg. She says the pain is an 8/10. She said that her foot is less swollen since surgery. Patient reported that with prone press ups and over pressure the burning and her leg went away, and it started to feel a cool sensation. Patient reported she has been doing her HEP and not feeling much of a change yet.       Objective: See treatment diary below      Assessment: Tolerated treatment well. Patient felt significant relief with over pressure prone press ups. Patient was able to do bridges with minimal ROM but without back pain. Patient felt some back tension with STS that went away after prone press ups. Patient was given updated HEP with bridges and prone press ups. Patient would benefit from continued PT      Plan: Continue per plan of care.       Insurance:  A/CMS Eval/ Re-eval Auth #/ Referral # Total units or visits Start date  Expiration date KX? Visit limitation?  PT only or  PT+OT? Co-Insurance   CMS 10/22/24 [id number] 10/22/24 12/17/25    $25 copay                 POC Start Date POC Expiration Date Signed POC?   10/22/24 12/17/24 PENDING      Date 10/22 10/29 11/12            Visits/Units:  Used 1 2 3            Authed:  Remaining  15 14 13                  Precautions: hx of spinal fusion, recent foot surgery in boot (WBAT)  HEP: Access Code [email or web address]     Date   11/12 10/29 10/22/24   Visit Number   3 2 1 (IE)   Manuals        AP mobilizations    X10  grade 3   Better                              Neuro Re-Ed         Pelvic tilts    Post- 1x10  Ant- 1x10    Bent knee fallout    W/TA 1x15 B    SLR w/TA   3x10 1x10 BLE    Bridge    2x10 3s      STS   X10 7lb      Marches supine    3x10     Cat cow    2x10     Ther Ex        FEMI   3 mins  4 mins    Press ups    2x10      Piriformis str    2x30s B    YARELI str    2x30s B    Modified chayito str    2x30s B                                    Ther Activity                        Gait Training                        Modalities

## 2024-11-14 ENCOUNTER — OFFICE VISIT (OUTPATIENT)
Dept: PHYSICAL THERAPY | Facility: CLINIC | Age: 49
End: 2024-11-14
Payer: COMMERCIAL

## 2024-11-14 DIAGNOSIS — M96.1 FAILED BACK SYNDROME: ICD-10-CM

## 2024-11-14 DIAGNOSIS — M54.50 CHRONIC LEFT-SIDED LOW BACK PAIN, UNSPECIFIED WHETHER SCIATICA PRESENT: ICD-10-CM

## 2024-11-14 DIAGNOSIS — M48.07 SPINAL STENOSIS OF LUMBOSACRAL REGION: ICD-10-CM

## 2024-11-14 DIAGNOSIS — M54.16 LUMBAR RADICULOPATHY: Primary | ICD-10-CM

## 2024-11-14 DIAGNOSIS — G89.29 CHRONIC LEFT-SIDED LOW BACK PAIN, UNSPECIFIED WHETHER SCIATICA PRESENT: ICD-10-CM

## 2024-11-14 DIAGNOSIS — M79.7 FIBROMYALGIA: ICD-10-CM

## 2024-11-14 PROCEDURE — 97112 NEUROMUSCULAR REEDUCATION: CPT

## 2024-11-14 PROCEDURE — 97140 MANUAL THERAPY 1/> REGIONS: CPT

## 2024-11-14 NOTE — PROGRESS NOTES
Daily Note     Today's date: 2024  Patient name: Mooyd Garrido  : 1975  MRN: [id number]  Referring provider: Prabhakar Mao MD  Dx:   Encounter Diagnosis     ICD-10-CM    1. Lumbar radiculopathy  M54.16       2. Fibromyalgia  M79.7       3. Chronic left-sided low back pain, unspecified whether sciatica present  M54.50     G89.29       4. Failed back syndrome  M96.1       5. Spinal stenosis of lumbosacral region  M48.07                      Subjective: Patient reports that after last session she felt better. However, she said today is a bad day. She said yesterday she had people over and was cooking and on her feet a lot.       Objective: See treatment diary below      Assessment: Tolerated treatment well. Patient felt relief with her back during bridges. Patient would benefit from continued PT      Plan: Continue per plan of care.         Insurance:  A/CMS Eval/ Re-eval Auth #/ Referral # Total units or visits Start date  Expiration date KX? Visit limitation?  PT only or  PT+OT? Co-Insurance   CMS 10/22/24 [id number] 16 10/22/24 12/17/25    $25 copay                 POC Start Date POC Expiration Date Signed POC?   10/22/24 12/17/24 PENDING      Date 10/22 10/29 11/12 11/14           Visits/Units:  Used 1 2 3 4           Authed:  Remaining  15 14 13 12                 Precautions: hx of spinal fusion, recent foot surgery in boot (WBAT)  HEP: Access Code [email or web address]     Date  11/14 11/12 10/29 10/22/24   Visit Number  4 3 2 1 (IE)   Manuals        AP mobilizations   Grade 3 no change better in moment  X10 grade 3   Better                              Neuro Re-Ed         Pelvic tilts    Post- 1x10  Ant- 1x10    Bent knee fallout    W/TA 1x15 B    SLR w/TA   3x10 1x10 BLE    Bridge   3x10 3s  2x10 3s      STS   X10 7lb      TA Marches supine   2x10 3x10     Cat cow   3x10 2x10     Prone extension   3x10      Paloff walk outs  X5 laps 3 lb       Ther Ex        FEMI   3 mins  4 mins    Press ups   X10 arms  hurt  2x10      Standing extension against table   2x10 no change      Piriformis str  2x30s  2x30s B    YARELI str    2x30s B    Modified chayito str  2x30s B  2x30s B    LTR  2x10                               Ther Activity                        Gait Training                        Modalities

## 2024-11-18 ENCOUNTER — OFFICE VISIT (OUTPATIENT)
Dept: FAMILY MEDICINE CLINIC | Facility: CLINIC | Age: 49
End: 2024-11-18
Payer: COMMERCIAL

## 2024-11-18 VITALS
HEIGHT: 68 IN | SYSTOLIC BLOOD PRESSURE: 140 MMHG | DIASTOLIC BLOOD PRESSURE: 80 MMHG | HEART RATE: 88 BPM | WEIGHT: 194.8 LBS | BODY MASS INDEX: 29.52 KG/M2 | TEMPERATURE: 98 F | OXYGEN SATURATION: 97 % | RESPIRATION RATE: 16 BRPM

## 2024-11-18 DIAGNOSIS — I10 PRIMARY HYPERTENSION: ICD-10-CM

## 2024-11-18 DIAGNOSIS — N30.00 ACUTE CYSTITIS WITHOUT HEMATURIA: Primary | ICD-10-CM

## 2024-11-18 DIAGNOSIS — N39.0 RECURRENT UTI: ICD-10-CM

## 2024-11-18 DIAGNOSIS — J01.40 ACUTE NON-RECURRENT PANSINUSITIS: ICD-10-CM

## 2024-11-18 DIAGNOSIS — B37.31 VAGINAL CANDIDOSIS: ICD-10-CM

## 2024-11-18 LAB
SL AMB  POCT GLUCOSE, UA: 0
SL AMB LEUKOCYTE ESTERASE,UA: 0
SL AMB POCT BILIRUBIN,UA: 1
SL AMB POCT BLOOD,UA: 0
SL AMB POCT CLARITY,UA: CLEAR
SL AMB POCT COLOR,UA: ABNORMAL
SL AMB POCT KETONES,UA: 15
SL AMB POCT NITRITE,UA: POSITIVE
SL AMB POCT PH,UA: 5
SL AMB POCT SPECIFIC GRAVITY,UA: 1.02
SL AMB POCT URINE PROTEIN: 30
SL AMB POCT UROBILINOGEN: 1

## 2024-11-18 PROCEDURE — 99214 OFFICE O/P EST MOD 30 MIN: CPT | Performed by: STUDENT IN AN ORGANIZED HEALTH CARE EDUCATION/TRAINING PROGRAM

## 2024-11-18 PROCEDURE — G2211 COMPLEX E/M VISIT ADD ON: HCPCS | Performed by: STUDENT IN AN ORGANIZED HEALTH CARE EDUCATION/TRAINING PROGRAM

## 2024-11-18 PROCEDURE — 81003 URINALYSIS AUTO W/O SCOPE: CPT | Performed by: STUDENT IN AN ORGANIZED HEALTH CARE EDUCATION/TRAINING PROGRAM

## 2024-11-18 RX ORDER — FLUCONAZOLE 150 MG/1
150 TABLET ORAL ONCE
Qty: 1 TABLET | Refills: 0 | Status: SHIPPED | OUTPATIENT
Start: 2024-11-18 | End: 2024-11-18

## 2024-11-18 RX ORDER — CIPROFLOXACIN 500 MG/1
500 TABLET, FILM COATED ORAL EVERY 12 HOURS SCHEDULED
Qty: 14 TABLET | Refills: 0 | Status: SHIPPED | OUTPATIENT
Start: 2024-11-18 | End: 2024-11-18

## 2024-11-18 RX ORDER — ESTRADIOL 0.1 MG/G
CREAM VAGINAL
COMMUNITY
Start: 2024-11-11

## 2024-11-18 RX ORDER — METOPROLOL SUCCINATE 25 MG/1
25 TABLET, EXTENDED RELEASE ORAL DAILY
Qty: 30 TABLET | Refills: 0 | Status: SHIPPED | OUTPATIENT
Start: 2024-11-18

## 2024-11-18 RX ORDER — NEOMYCIN SULFATE, POLYMYXIN B SULFATE AND HYDROCORTISONE 10; 3.5; 1 MG/ML; MG/ML; [USP'U]/ML
4 SUSPENSION/ DROPS AURICULAR (OTIC) 4 TIMES DAILY
Qty: 10 ML | Refills: 0 | Status: SHIPPED | OUTPATIENT
Start: 2024-11-18

## 2024-11-18 RX ORDER — CLOBETASOL PROPIONATE 0.5 MG/G
OINTMENT TOPICAL
COMMUNITY
Start: 2024-11-11

## 2024-11-18 NOTE — PROGRESS NOTES
Name: Moody Garrido      : 1975      MRN: [id number]  Encounter Provider: Katarina Gonsalez MD  Encounter Date: 2024   Encounter department: [facility]  :  Assessment & Plan  Acute cystitis without hematuria    Orders:    Urine culture    amoxicillin-clavulanate (AUGMENTIN) 875-125 mg per tablet; Take 1 tablet by mouth every 12 (twelve) hours for 7 days    Vaginal candidosis    Orders:    fluconazole (DIFLUCAN) 150 mg tablet; Take 1 tablet (150 mg total) by mouth once for 1 dose    Primary hypertension  -/80, patient tachycardic in the morning  -Denies chest pain, SOB, trouble breathing and dizziness  Orders:    metoprolol succinate (TOPROL-XL) 25 mg 24 hr tablet; Take 1 tablet (25 mg total) by mouth daily    Recurrent UTI    Orders:    Ambulatory Referral to Urology; Future    US kidney and bladder; Future    Acute non-recurrent pansinusitis    Orders:    amoxicillin-clavulanate (AUGMENTIN) 875-125 mg per tablet; Take 1 tablet by mouth every 12 (twelve) hours for 7 days    neomycin-polymyxin-hydrocortisone (CORTISPORIN) 0.35%-10,000 units/mL-1% otic suspension; Administer 4 drops to the right ear 4 (four) times a day     Increase fluid intake as tolerated  Antibiotic for full course  Flonase OTC 1-2 sprays each nostril daily PRN for postnasal drip  RTO in 1 week if symptoms persist or worsen         History of Present Illness     HPI    Patient presents for follow up. She notes her Bps remain elevated at home and she feels tachycardic in the morning. She is following up for UTI. She notes she is having some frequency. She does also notes some nasal congestion and sinus pressure. She is having ear pain.     Review of Systems   Constitutional:  Negative for activity change, appetite change, chills, fatigue and fever.   HENT:  Positive for congestion, ear pain, postnasal drip, rhinorrhea and sinus pressure. Negative for sore throat.    Respiratory:  Negative for cough and  "wheezing.    Cardiovascular:  Negative for chest pain, palpitations and leg swelling.   Gastrointestinal:  Negative for abdominal pain, constipation, diarrhea, nausea and vomiting.   Musculoskeletal:  Positive for myalgias.   Neurological:  Negative for weakness, light-headedness and headaches.   Psychiatric/Behavioral:  The patient is not nervous/anxious.           Objective   /80   Pulse 88   Temp 98 °F (36.7 °C)   Resp 16   Ht 5' 8\" (1.727 m)   Wt 88.4 kg (194 lb 12.8 oz)   LMP 08/15/2023 (Approximate) Comment: post menapausal  SpO2 97%   BMI 29.62 kg/m²      Physical Exam  Constitutional:       Appearance: Normal appearance.   HENT:      Head: Normocephalic and atraumatic.      Right Ear: Hearing, ear canal and external ear normal. A middle ear effusion is present.      Left Ear: Hearing, ear canal and external ear normal. A middle ear effusion is present.      Nose: Congestion present.      Mouth/Throat:      Pharynx: Posterior oropharyngeal erythema present.   Cardiovascular:      Rate and Rhythm: Normal rate and regular rhythm.      Pulses: Normal pulses.      Heart sounds: Normal heart sounds.   Pulmonary:      Effort: Pulmonary effort is normal.      Breath sounds: Normal breath sounds.   Neurological:      General: No focal deficit present.      Mental Status: She is alert and oriented to person, place, and time.   Psychiatric:         Mood and Affect: Mood normal.         Behavior: Behavior normal.         Thought Content: Thought content normal.         Judgment: Judgment normal.         "

## 2024-11-18 NOTE — ASSESSMENT & PLAN NOTE
-/80, patient tachycardic in the morning  -Denies chest pain, SOB, trouble breathing and dizziness  Orders:    metoprolol succinate (TOPROL-XL) 25 mg 24 hr tablet; Take 1 tablet (25 mg total) by mouth daily

## 2024-11-19 ENCOUNTER — APPOINTMENT (OUTPATIENT)
Dept: PHYSICAL THERAPY | Facility: CLINIC | Age: 49
End: 2024-11-19
Payer: COMMERCIAL

## 2024-11-20 ENCOUNTER — OFFICE VISIT (OUTPATIENT)
Age: 49
End: 2024-11-20

## 2024-11-20 ENCOUNTER — APPOINTMENT (OUTPATIENT)
Dept: RADIOLOGY | Facility: CLINIC | Age: 49
End: 2024-11-20
Payer: COMMERCIAL

## 2024-11-20 ENCOUNTER — APPOINTMENT (OUTPATIENT)
Dept: LAB | Facility: CLINIC | Age: 49
End: 2024-11-20
Payer: COMMERCIAL

## 2024-11-20 VITALS
RESPIRATION RATE: 16 BRPM | SYSTOLIC BLOOD PRESSURE: 119 MMHG | WEIGHT: 194 LBS | HEART RATE: 61 BPM | BODY MASS INDEX: 29.4 KG/M2 | DIASTOLIC BLOOD PRESSURE: 84 MMHG | HEIGHT: 68 IN

## 2024-11-20 DIAGNOSIS — M20.42 HAMMERTOE OF LEFT FOOT: Primary | ICD-10-CM

## 2024-11-20 DIAGNOSIS — Z98.890 POST-OPERATIVE STATE: ICD-10-CM

## 2024-11-20 DIAGNOSIS — M20.42 HAMMERTOE OF LEFT FOOT: ICD-10-CM

## 2024-11-20 DIAGNOSIS — R63.5 WEIGHT GAIN: ICD-10-CM

## 2024-11-20 DIAGNOSIS — M21.962 METATARSAL DEFORMITY, LEFT: ICD-10-CM

## 2024-11-20 DIAGNOSIS — R79.9 ABNORMAL FINDING OF BLOOD CHEMISTRY, UNSPECIFIED: ICD-10-CM

## 2024-11-20 DIAGNOSIS — Z80.3 FAMILY HISTORY OF MALIGNANT NEOPLASM OF BREAST: ICD-10-CM

## 2024-11-20 DIAGNOSIS — L65.9 HAIR LOSS: ICD-10-CM

## 2024-11-20 DIAGNOSIS — K21.9 GASTROESOPHAGEAL REFLUX DISEASE WITHOUT ESOPHAGITIS: ICD-10-CM

## 2024-11-20 LAB
ALBUMIN SERPL BCG-MCNC: 4.6 G/DL (ref 3.5–5)
ALP SERPL-CCNC: 50 U/L (ref 34–104)
ALT SERPL W P-5'-P-CCNC: 30 U/L (ref 7–52)
ANION GAP SERPL CALCULATED.3IONS-SCNC: 9 MMOL/L (ref 4–13)
AST SERPL W P-5'-P-CCNC: 35 U/L (ref 13–39)
BACTERIA UR CULT: NORMAL
BILIRUB SERPL-MCNC: 1.23 MG/DL (ref 0.2–1)
BUN SERPL-MCNC: 11 MG/DL (ref 5–25)
CALCIUM SERPL-MCNC: 9.7 MG/DL (ref 8.4–10.2)
CHLORIDE SERPL-SCNC: 100 MMOL/L (ref 96–108)
CHOLEST SERPL-MCNC: 248 MG/DL (ref ?–200)
CO2 SERPL-SCNC: 31 MMOL/L (ref 21–32)
CREAT SERPL-MCNC: 0.61 MG/DL (ref 0.6–1.3)
GFR SERPL CREATININE-BSD FRML MDRD: 106 ML/MIN/1.73SQ M
GLUCOSE P FAST SERPL-MCNC: 113 MG/DL (ref 65–99)
HDLC SERPL-MCNC: 105 MG/DL
LDLC SERPL CALC-MCNC: 128 MG/DL (ref 0–100)
Lab: NORMAL
POTASSIUM SERPL-SCNC: 3.8 MMOL/L (ref 3.5–5.3)
PROT SERPL-MCNC: 7.8 G/DL (ref 6.4–8.4)
SODIUM SERPL-SCNC: 140 MMOL/L (ref 135–147)
TRIGL SERPL-MCNC: 73 MG/DL (ref ?–150)
TSH SERPL DL<=0.05 MIU/L-ACNC: 3.07 UIU/ML (ref 0.45–4.5)

## 2024-11-20 PROCEDURE — 36415 COLL VENOUS BLD VENIPUNCTURE: CPT

## 2024-11-20 PROCEDURE — 73630 X-RAY EXAM OF FOOT: CPT

## 2024-11-20 PROCEDURE — 80061 LIPID PANEL: CPT

## 2024-11-20 PROCEDURE — 99024 POSTOP FOLLOW-UP VISIT: CPT | Performed by: STUDENT IN AN ORGANIZED HEALTH CARE EDUCATION/TRAINING PROGRAM

## 2024-11-20 PROCEDURE — 84443 ASSAY THYROID STIM HORMONE: CPT

## 2024-11-20 PROCEDURE — 80053 COMPREHEN METABOLIC PANEL: CPT

## 2024-11-21 ENCOUNTER — OFFICE VISIT (OUTPATIENT)
Dept: PHYSICAL THERAPY | Facility: CLINIC | Age: 49
End: 2024-11-21
Payer: COMMERCIAL

## 2024-11-21 DIAGNOSIS — M48.07 SPINAL STENOSIS OF LUMBOSACRAL REGION: ICD-10-CM

## 2024-11-21 DIAGNOSIS — M96.1 FAILED BACK SYNDROME: ICD-10-CM

## 2024-11-21 DIAGNOSIS — M79.7 FIBROMYALGIA: ICD-10-CM

## 2024-11-21 DIAGNOSIS — M54.50 CHRONIC LEFT-SIDED LOW BACK PAIN, UNSPECIFIED WHETHER SCIATICA PRESENT: ICD-10-CM

## 2024-11-21 DIAGNOSIS — G89.29 CHRONIC LEFT-SIDED LOW BACK PAIN, UNSPECIFIED WHETHER SCIATICA PRESENT: ICD-10-CM

## 2024-11-21 DIAGNOSIS — M54.16 LUMBAR RADICULOPATHY: Primary | ICD-10-CM

## 2024-11-21 PROCEDURE — 97112 NEUROMUSCULAR REEDUCATION: CPT

## 2024-11-21 PROCEDURE — 97110 THERAPEUTIC EXERCISES: CPT

## 2024-11-21 NOTE — PROGRESS NOTES
Daily Note     Today's date: 2024  Patient name: Moody Garrido  : 1975  MRN: [id number]  Referring provider: Prabhakar Mao MD  Dx:   Encounter Diagnosis     ICD-10-CM    1. Lumbar radiculopathy  M54.16       2. Fibromyalgia  M79.7       3. Chronic left-sided low back pain, unspecified whether sciatica present  M54.50     G89.29       4. Failed back syndrome  M96.1       5. Spinal stenosis of lumbosacral region  M48.07           Start Time: 805  Stop Time: 847  Total time in clinic (min): 42 minutes    Subjective: Pt reports having her follow-up with podiatry and was told the surgery didn't take, and will most likely require another surgery for her foot. Pt continues with low back pain and increased soreness in her mid-back which may be from the extension exercises and trying to have better posture.       Objective: See treatment diary below      Assessment: Tolerated treatment well. Patient demonstrated fatigue post treatment, exhibited good technique with therapeutic exercises, and would benefit from continued PT      Plan: Continue per plan of care.  Progress treatment as tolerated.          Insurance:  AMA/CMS Eval/ Re-eval Auth #/ Referral # Total units or visits Start date  Expiration date KX? Visit limitation?  PT only or  PT+OT? Co-Insurance   CMS 10/22/24 [id number] 16 10/22/24 12/17/25    $25 copay                 POC Start Date POC Expiration Date Signed POC?   10/22/24 12/17/24 PENDING      Date 10/22 10/29 11/12 11/14 11/21          Visits/Units:  Used 1 2 3 4 5          Authed:  Remaining  15 14 13 12 11                Precautions: hx of spinal fusion, recent foot surgery in boot (WBAT)  HEP: Access Code [email or web address]     Date 11/21 11/14 11/12 10/29 10/22/24   Visit Number 5 4 3 2 1 (IE)   Manuals        AP mobilizations   Grade 3 no change better in moment  X10 grade 3   Better                              Neuro Re-Ed         Pelvic tilts    Post- 1x10  Ant- 1x10    Bent knee fallout  W/TA and RTB 2x10 B m   W/TA 1x15 B    SLR w/TA 2x10 B  3x10 1x10 BLE    Bridge  1x15, 3s 3x10 3s  2x10 3s      STS   X10 7lb      TA HS curl on pball 3x10       TA Marches supine  2x10 B 2x10 3x10     Cat cow  2x10 3x10 2x10     Prone extension   3x10      Paloff walk outs  X5 laps 3 lb       Ther Ex        FEMI   3 mins  4 mins    Press ups  x10 X10 arms hurt  2x10      Standing extension against table   2x10 no change      Piriformis str  2x30s  2x30s B    YARELI str    2x30s B    Modified chayito str  2x30s B  2x30s B    LTR  2x10                               Ther Activity                        Gait Training                        Modalities

## 2024-11-25 NOTE — PROGRESS NOTES
[facility] Visit    ASSESSMENT     Diagnoses and all orders for this visit:    Hammertoe of left foot  -     Cancel: X-ray foot left 3+ views; Future  -     Cancel: X-ray foot right 3+ views; Future  -     X-ray foot left 3+ views; Future    Metatarsal deformity, left  -     Cancel: X-ray foot left 3+ views; Future  -     Cancel: X-ray foot right 3+ views; Future  -     X-ray foot left 3+ views; Future    Post-operative state  -     Cancel: X-ray foot left 3+ views; Future  -     Cancel: X-ray foot right 3+ views; Future  -     X-ray foot left 3+ views; Future             Problem List Items Addressed This Visit    None  Visit Diagnoses         Hammertoe of left foot    -  Primary    Relevant Orders    X-ray foot left 3+ views (Completed)      Metatarsal deformity, left        Relevant Orders    X-ray foot left 3+ views (Completed)      Post-operative state        Relevant Orders    X-ray foot left 3+ views (Completed)            PLAN  -Moody and I discussed her left foot  -Continue weightbearing as tolerated in sneakers  -Return to clinic 4-weeks    SUBJECTIVE    Chief Complaint:  Status post left Weil osteotomy with hammertoe repair third digit     Patient ID: Moody Garrido     11/20/2024: Moody is doing well today. She states that she is having pain and soreness to the plantar aspect of her left foot.  She also states that she is unable to move her left third digit the same as her other toes.  This is concerning to her.      The following portions of the patient's history were reviewed and updated as appropriate: allergies, current medications, past family history, past medical history, past social history, past surgical history and problem list.    Review of Systems   Constitutional: Negative.    HENT: Negative.     Respiratory: Negative.     Cardiovascular: Negative.    Gastrointestinal: Negative.    Musculoskeletal: Negative.    Skin: Negative.    Neurological: Negative.  "         OBJECTIVE      /84   Pulse 61   Resp 16   Ht 5' 8\" (1.727 m)   Wt 88 kg (194 lb)   LMP 08/15/2023 (Approximate) Comment: post menapausal  BMI 29.50 kg/m²        Physical Exam  Constitutional:       Appearance: Normal appearance.   HENT:      Head: Normocephalic and atraumatic.   Eyes:      General:         Right eye: No discharge.         Left eye: No discharge.   Cardiovascular:      Rate and Rhythm: Normal rate and regular rhythm.      Pulses:           Dorsalis pedis pulses are 2+ on the right side and 2+ on the left side.        Posterior tibial pulses are 2+ on the right side and 2+ on the left side.   Pulmonary:      Effort: Pulmonary effort is normal.      Breath sounds: Normal breath sounds.   Skin:     General: Skin is warm.      Capillary Refill: Capillary refill takes less than 2 seconds.   Neurological:      Sensory: Sensation is intact. No sensory deficit.         Vascular:  -DP and PT pulses intact b/l  -Capillary refill time <2 sec b/l  -Digital hair growth: Present  -Skin temp: WNL    MSK:  -Pain on palpation to the dorsal aspect of the third digit and third metatarsal head plantarly  -No gross deformities noted  -MMT is 5/5 to all muscle compartments of the lower extremity  -Ankle dorsiflexion >10 degrees with knee extended and knee flexed b/l  -Left third digit remains in rectus position    Neuro:  -Light sensation intact bilaterally  -Protective sensation intact bilaterally    Derm:  -No lesions, abrasions, or open wounds noted  -No noted interdigital maceration, peeling, malodor  -No callus formation noted on exam  -All incision sites remain fully healed at this time without complication.        "

## 2024-11-26 ENCOUNTER — TELEPHONE (OUTPATIENT)
Age: 49
End: 2024-11-26

## 2024-11-26 NOTE — TELEPHONE ENCOUNTER
Diagnosis/Complaint:Recurrent UTI'S    Insurance:[facility] Complete    History cancer:no    Previous urologist:no    Outside testing/where:[facility]    If yes what kind:epic    Records requested:[facility]    Preferred location:[place]  01/06/25 8:40

## 2024-11-27 ENCOUNTER — OFFICE VISIT (OUTPATIENT)
Dept: FAMILY MEDICINE CLINIC | Facility: CLINIC | Age: 49
End: 2024-11-27
Payer: COMMERCIAL

## 2024-11-27 VITALS
WEIGHT: 190 LBS | DIASTOLIC BLOOD PRESSURE: 82 MMHG | HEIGHT: 68 IN | TEMPERATURE: 97.8 F | HEART RATE: 77 BPM | SYSTOLIC BLOOD PRESSURE: 124 MMHG | OXYGEN SATURATION: 100 % | BODY MASS INDEX: 28.79 KG/M2 | RESPIRATION RATE: 16 BRPM

## 2024-11-27 DIAGNOSIS — I10 PRIMARY HYPERTENSION: ICD-10-CM

## 2024-11-27 DIAGNOSIS — N30.10 INTERSTITIAL CYSTITIS: Primary | ICD-10-CM

## 2024-11-27 DIAGNOSIS — J01.40 ACUTE NON-RECURRENT PANSINUSITIS: ICD-10-CM

## 2024-11-27 LAB
SL AMB  POCT GLUCOSE, UA: NORMAL
SL AMB LEUKOCYTE ESTERASE,UA: 25
SL AMB POCT BILIRUBIN,UA: 3
SL AMB POCT BLOOD,UA: ABNORMAL
SL AMB POCT CLARITY,UA: ABNORMAL
SL AMB POCT COLOR,UA: ABNORMAL
SL AMB POCT KETONES,UA: 15
SL AMB POCT NITRITE,UA: POSITIVE
SL AMB POCT PH,UA: 5
SL AMB POCT SPECIFIC GRAVITY,UA: 1.02
SL AMB POCT URINE PROTEIN: 30
SL AMB POCT UROBILINOGEN: 4

## 2024-11-27 PROCEDURE — G2211 COMPLEX E/M VISIT ADD ON: HCPCS | Performed by: STUDENT IN AN ORGANIZED HEALTH CARE EDUCATION/TRAINING PROGRAM

## 2024-11-27 PROCEDURE — 87086 URINE CULTURE/COLONY COUNT: CPT | Performed by: STUDENT IN AN ORGANIZED HEALTH CARE EDUCATION/TRAINING PROGRAM

## 2024-11-27 PROCEDURE — 81002 URINALYSIS NONAUTO W/O SCOPE: CPT | Performed by: STUDENT IN AN ORGANIZED HEALTH CARE EDUCATION/TRAINING PROGRAM

## 2024-11-27 PROCEDURE — 99214 OFFICE O/P EST MOD 30 MIN: CPT | Performed by: STUDENT IN AN ORGANIZED HEALTH CARE EDUCATION/TRAINING PROGRAM

## 2024-11-27 RX ORDER — HYDROCHLOROTHIAZIDE 12.5 MG/1
12.5 TABLET ORAL DAILY
Qty: 30 TABLET | Refills: 0 | Status: SHIPPED | OUTPATIENT
Start: 2024-11-27 | End: 2024-11-28

## 2024-11-27 RX ORDER — METOPROLOL SUCCINATE 25 MG/1
25 TABLET, EXTENDED RELEASE ORAL DAILY
Qty: 90 TABLET | Refills: 0 | Status: SHIPPED | OUTPATIENT
Start: 2024-11-27

## 2024-11-27 RX ORDER — RAMIPRIL 10 MG/1
10 CAPSULE ORAL DAILY
Qty: 90 CAPSULE | Refills: 0 | Status: SHIPPED | OUTPATIENT
Start: 2024-11-27

## 2024-11-27 RX ORDER — PREDNISONE 10 MG/1
TABLET ORAL
Qty: 27 TABLET | Refills: 0 | Status: SHIPPED | OUTPATIENT
Start: 2024-11-27 | End: 2024-12-07

## 2024-11-27 NOTE — PROGRESS NOTES
Name: Moody Garrido      : 1975      MRN: [id number]  Encounter Provider: Katarina Gonsalez MD  Encounter Date: 2024   Encounter department: [facility]  :  Assessment & Plan  Interstitial cystitis  -Urine dip positive for nitrites  -Follow up with urology   -Continue with Pyridium 95 mg TID PRN  Orders:    POCT urine dip    Urine culture    Primary hypertension  /82, patient reports increased Bps of 150/90 in the evening  -Denies chest pain, palpitations, headache and shortness of breath  -Will start HCTZ to be taking in the morning and patient can take ramipril at night  -Continue with Toprol XL 25 mg daily  Orders:    hydroCHLOROthiazide 12.5 mg tablet; Take 1 tablet (12.5 mg total) by mouth daily    ramipril (ALTACE) 10 MG capsule; Take 1 capsule (10 mg total) by mouth daily    Acute non-recurrent pansinusitis    Orders:    predniSONE 10 mg tablet; Take 4 tablets (40 mg total) by mouth daily for 3 days, THEN 3 tablets (30 mg total) daily for 3 days, THEN 2 tablets (20 mg total) daily for 2 days, THEN 1 tablet (10 mg total) daily for 2 days.           History of Present Illness     HPI    Patient presents for follow up for BP. She notes she has been taking both BP medications in the morning because if she takes one in the morning and one at night her BP is going up. Palpitations have improved. She notes she continues to have some ear pain. She does have a history of interstitial cystitis and is scheduled with urology in . No fever or chills.       Review of Systems   Constitutional:  Negative for activity change, chills, diaphoresis, fatigue and fever.   HENT:  Positive for ear pain. Negative for congestion, postnasal drip, rhinorrhea and sore throat.    Respiratory:  Negative for cough, shortness of breath and wheezing.    Cardiovascular:  Negative for chest pain, palpitations and leg swelling.   Gastrointestinal:  Negative for abdominal pain, constipation,  diarrhea, nausea and vomiting.   Musculoskeletal:  Positive for arthralgias and myalgias.   Skin:  Negative for rash.   Neurological:  Negative for weakness, light-headedness and headaches.   Psychiatric/Behavioral:  The patient is not nervous/anxious.           Objective   LMP 08/15/2023 (Approximate) Comment: post menapausal     Physical Exam

## 2024-11-27 NOTE — ASSESSMENT & PLAN NOTE
/82, patient reports increased Bps of 150/90 in the evening  -Denies chest pain, palpitations, headache and shortness of breath  -Will start HCTZ to be taking in the morning and patient can take ramipril at night  -Continue with Toprol XL 25 mg daily  Orders:    hydroCHLOROthiazide 12.5 mg tablet; Take 1 tablet (12.5 mg total) by mouth daily    ramipril (ALTACE) 10 MG capsule; Take 1 capsule (10 mg total) by mouth daily

## 2024-11-28 LAB — BACTERIA UR CULT: NORMAL

## 2024-11-28 RX ORDER — HYDROCHLOROTHIAZIDE 12.5 MG/1
TABLET ORAL
Qty: 90 TABLET | Refills: 1 | Status: SHIPPED | OUTPATIENT
Start: 2024-11-28

## 2024-12-01 ENCOUNTER — RA CDI HCC (OUTPATIENT)
Dept: OTHER | Facility: HOSPITAL | Age: 49
End: 2024-12-01

## 2024-12-03 LAB — MISCELLANEOUS LAB TEST RESULT: NORMAL

## 2024-12-04 ENCOUNTER — APPOINTMENT (OUTPATIENT)
Dept: PHYSICAL THERAPY | Facility: CLINIC | Age: 49
End: 2024-12-04
Payer: COMMERCIAL

## 2024-12-04 DIAGNOSIS — B37.31 VAGINAL CANDIDIASIS: Primary | ICD-10-CM

## 2024-12-04 RX ORDER — FLUCONAZOLE 150 MG/1
150 TABLET ORAL ONCE
Qty: 1 TABLET | Refills: 0 | Status: SHIPPED | OUTPATIENT
Start: 2024-12-04 | End: 2024-12-04

## 2024-12-06 ENCOUNTER — OFFICE VISIT (OUTPATIENT)
Dept: PHYSICAL THERAPY | Facility: CLINIC | Age: 49
End: 2024-12-06
Payer: COMMERCIAL

## 2024-12-06 DIAGNOSIS — M54.16 LUMBAR RADICULOPATHY: ICD-10-CM

## 2024-12-06 DIAGNOSIS — M96.1 FAILED BACK SYNDROME: ICD-10-CM

## 2024-12-06 DIAGNOSIS — M79.7 FIBROMYALGIA: Primary | ICD-10-CM

## 2024-12-06 DIAGNOSIS — M48.07 SPINAL STENOSIS OF LUMBOSACRAL REGION: ICD-10-CM

## 2024-12-06 DIAGNOSIS — M54.50 CHRONIC LEFT-SIDED LOW BACK PAIN, UNSPECIFIED WHETHER SCIATICA PRESENT: ICD-10-CM

## 2024-12-06 DIAGNOSIS — G89.29 CHRONIC LEFT-SIDED LOW BACK PAIN, UNSPECIFIED WHETHER SCIATICA PRESENT: ICD-10-CM

## 2024-12-06 PROCEDURE — 97140 MANUAL THERAPY 1/> REGIONS: CPT

## 2024-12-06 PROCEDURE — 97110 THERAPEUTIC EXERCISES: CPT
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
## 2024-12-09 DIAGNOSIS — M54.16 RADICULOPATHY, LUMBAR REGION: ICD-10-CM

## 2024-12-10 ENCOUNTER — EVALUATION (OUTPATIENT)
Dept: PHYSICAL THERAPY | Facility: CLINIC | Age: 49
End: 2024-12-10
Payer: COMMERCIAL

## 2024-12-10 DIAGNOSIS — M48.07 SPINAL STENOSIS OF LUMBOSACRAL REGION: ICD-10-CM

## 2024-12-10 DIAGNOSIS — M96.1 FAILED BACK SYNDROME: ICD-10-CM

## 2024-12-10 DIAGNOSIS — G89.29 CHRONIC LEFT-SIDED LOW BACK PAIN, UNSPECIFIED WHETHER SCIATICA PRESENT: ICD-10-CM

## 2024-12-10 DIAGNOSIS — M54.50 CHRONIC LEFT-SIDED LOW BACK PAIN, UNSPECIFIED WHETHER SCIATICA PRESENT: ICD-10-CM

## 2024-12-10 DIAGNOSIS — M79.7 FIBROMYALGIA: Primary | ICD-10-CM

## 2024-12-10 PROCEDURE — 97110 THERAPEUTIC EXERCISES: CPT

## 2024-12-10 PROCEDURE — 97112 NEUROMUSCULAR REEDUCATION: CPT

## 2024-12-10 RX ORDER — GABAPENTIN 300 MG/1
CAPSULE ORAL
Qty: 60 CAPSULE | Refills: 0 | Status: SHIPPED | OUTPATIENT
Start: 2024-12-10
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
## 2024-12-11 ENCOUNTER — OFFICE VISIT (OUTPATIENT)
Dept: FAMILY MEDICINE CLINIC | Facility: CLINIC | Age: 49
End: 2024-12-11
Payer: COMMERCIAL

## 2024-12-11 VITALS
SYSTOLIC BLOOD PRESSURE: 110 MMHG | HEIGHT: 68 IN | TEMPERATURE: 97.2 F | RESPIRATION RATE: 16 BRPM | HEART RATE: 62 BPM | WEIGHT: 190 LBS | OXYGEN SATURATION: 100 % | BODY MASS INDEX: 28.79 KG/M2 | DIASTOLIC BLOOD PRESSURE: 70 MMHG

## 2024-12-11 DIAGNOSIS — M54.16 RADICULOPATHY, LUMBAR REGION: ICD-10-CM

## 2024-12-11 DIAGNOSIS — H92.01 RIGHT EAR PAIN: ICD-10-CM

## 2024-12-11 DIAGNOSIS — I10 PRIMARY HYPERTENSION: ICD-10-CM

## 2024-12-11 DIAGNOSIS — Z71.83 ENCOUNTER FOR NONPROCREATIVE GENETIC COUNSELING: Primary | ICD-10-CM

## 2024-12-11 PROCEDURE — G2211 COMPLEX E/M VISIT ADD ON: HCPCS | Performed by: STUDENT IN AN ORGANIZED HEALTH CARE EDUCATION/TRAINING PROGRAM

## 2024-12-11 PROCEDURE — 99214 OFFICE O/P EST MOD 30 MIN: CPT | Performed by: STUDENT IN AN ORGANIZED HEALTH CARE EDUCATION/TRAINING PROGRAM

## 2024-12-11 NOTE — PROGRESS NOTES
Name: Moody Garrido      : 1975      MRN: [id number]  Encounter Provider: Ktaarina Gonsalez MD  Encounter Date: 2024   Encounter department: [facility]  :  Assessment & Plan  Encounter for nonprocreative genetic counseling  -BRCA testing negative but recommended further genetic counseling  Orders:  •  Ambulatory Referral to Genetics; Future    Right ear pain  -Persistent right ear pain, denies hearing loss or ear discharge  Orders:  •  Ambulatory Referral to Otolaryngology; Future    Primary hypertension  -/70  -Patient did not start HCTZ, will stop medication  -Denies chest pain, SOB, and palpitations  Orders:  •  Lipid Panel with Direct LDL reflex; Future  •  Comprehensive metabolic panel; Future    Radiculopathy, lumbar region  -Discussed with patient to start gabapentin 300 mg daily. Prescribed by pain specialist but patient has not started it due to fear of side effects. Patient stated she will start medication.               History of Present Illness     HPI    Patient presents for blood pressure check. Notes she has been doing well on current blood pressure regimen. She has not started taking the gabapentin out of of fear of side effects. She had blood work done recently which recommended genetic counseling.         Review of Systems   Constitutional:  Negative for activity change, chills, diaphoresis, fatigue and fever.   HENT:  Positive for ear pain. Negative for congestion, postnasal drip, rhinorrhea and sore throat.    Respiratory:  Negative for cough, shortness of breath and wheezing.    Cardiovascular:  Negative for chest pain, palpitations and leg swelling.   Gastrointestinal:  Negative for abdominal pain, constipation, diarrhea, nausea and vomiting.   Musculoskeletal:  Negative for myalgias.   Skin:  Negative for rash.   Neurological:  Negative for weakness, light-headedness and headaches.   Psychiatric/Behavioral:  The patient is not nervous/anxious.   "      Objective   /70   Pulse 62   Temp (!) 97.2 °F (36.2 °C)   Resp 16   Ht 5' 8\" (1.727 m)   Wt 86.2 kg (190 lb)   LMP 08/15/2023 (Approximate) Comment: post menapausal  SpO2 100%   BMI 28.89 kg/m²      Physical Exam  Constitutional:       Appearance: Normal appearance.   HENT:      Head: Normocephalic and atraumatic.      Right Ear: Hearing, tympanic membrane and external ear normal.      Left Ear: Hearing, tympanic membrane, ear canal and external ear normal.      Ears:      Comments: Erythematous right ear canal  Cardiovascular:      Rate and Rhythm: Normal rate and regular rhythm.      Pulses: Normal pulses.      Heart sounds: Normal heart sounds.   Neurological:      Mental Status: She is alert.         "

## 2024-12-11 NOTE — ASSESSMENT & PLAN NOTE
-Discussed with patient to start gabapentin 300 mg daily. Prescribed by pain specialist but patient has not started it due to fear of side effects. Patient stated she will start medication.

## 2024-12-11 NOTE — ASSESSMENT & PLAN NOTE
-/70  -Patient did not start HCTZ, will stop medication  -Denies chest pain, SOB, and palpitations  Orders:  •  Lipid Panel with Direct LDL reflex; Future  •  Comprehensive metabolic panel; Future

## 2024-12-12 ENCOUNTER — OFFICE VISIT (OUTPATIENT)
Dept: PHYSICAL THERAPY | Facility: CLINIC | Age: 49
End: 2024-12-12
Payer: COMMERCIAL

## 2024-12-12 DIAGNOSIS — G89.29 CHRONIC LEFT-SIDED LOW BACK PAIN, UNSPECIFIED WHETHER SCIATICA PRESENT: ICD-10-CM

## 2024-12-12 DIAGNOSIS — M54.16 LUMBAR RADICULOPATHY: ICD-10-CM

## 2024-12-12 DIAGNOSIS — M54.50 CHRONIC LEFT-SIDED LOW BACK PAIN, UNSPECIFIED WHETHER SCIATICA PRESENT: ICD-10-CM

## 2024-12-12 DIAGNOSIS — M79.7 FIBROMYALGIA: Primary | ICD-10-CM

## 2024-12-12 DIAGNOSIS — M48.07 SPINAL STENOSIS OF LUMBOSACRAL REGION: ICD-10-CM

## 2024-12-12 DIAGNOSIS — M96.1 FAILED BACK SYNDROME: ICD-10-CM

## 2024-12-12 PROCEDURE — 97110 THERAPEUTIC EXERCISES: CPT | Performed by: PHYSICAL THERAPIST

## 2024-12-12 PROCEDURE — 97112 NEUROMUSCULAR REEDUCATION: CPT | Performed by: PHYSICAL THERAPIST

## 2024-12-12 NOTE — PROGRESS NOTES
Daily Note     Today's date: 2024  Patient name: Moody Garrido  : 1975  MRN: [id number]  Referring provider: Prabhakar Mao MD  Dx:   Encounter Diagnosis     ICD-10-CM    1. Fibromyalgia  M79.7       2. Spinal stenosis of lumbosacral region  M48.07       3. Chronic left-sided low back pain, unspecified whether sciatica present  M54.50     G89.29       4. Failed back syndrome  M96.1       5. Lumbar radiculopathy  M54.16                      Subjective: Moody Garrido report continued constant pain in her low back and neck.        Objective: See treatment diary below      Assessment: Tolerated treatment well. Patient responded positively to increased pt OP into extension for lumbar spine in lying.  She had increased pain with attempt of ext in standing with belt snags.  She also responded well to prone hip rotation with self reports overall of decreased pain following session.      Plan: Continue per plan of care.         Insurance:  A/CMS Eval/ Re-eval Auth #/ Referral # Total units or visits Start date  Expiration date KX? Visit limitation?  PT only or  PT+OT? Co-Insurance   CMS 10/22/24 [id number] 16 10/22/24 12/17/25    $25 copay                 POC Start Date POC Expiration Date Signed POC?   10/22/24  12/10 12/17/24  1/20 PENDING      Date 10/22 10/29 11/12 11/14 11/21 12/6 12/10        Visits/Units:  Used 1 2 3 4 5 6 7        Authed:  Remaining  15 14 13 12 11 10 9              Precautions: hx of spinal fusion, recent foot surgery in boot (WBAT)  HEP: Access Code [email or web address]     Date 12/12 12/10 12/6 11/21 11/14   Visit Number [phone]   Manuals        AP mobilizations    Grade 2 cervical and thoracic   Grade 3 no change better in moment                            Neuro Re-Ed         Prone hip ext 2x10 B       Prone hip IR/ER 2x10 B       Sup sciatic tensioners 2x10 B       Bent knee fallout    W/TA and RTB 2x10 B m    SLR w/TA    2x10 B    Bridge   1x15 3s  1x15, 3s 3x10 3s    STS        TA HS  curl on pball    3x10    TA Marches supine     2x10 B 2x10   Cat cow     2x10 3x10   Prone extension      3x10   Paloff walk outs     X5 laps 3 lb    Ther Ex        FEMI 3 min 3 min      Press ups  X20  X20 pt OP - reduced pain  X20 belt OP - reducd pain X15    x15 x10 X10 arms hurt    Standing extension against table  Belt ext snag  2x10 initial reduction then increased pain    2x10 no change   Piriformis str     2x30s   YARELI str   2x30s      Modified chayito str   2x30s B   2x30s B   LTR 2x10  2x10   2x10    Open books   Sidelying 2x10      Cervical extension prone    x15     snag   2x10     Chin tuck ext    Towel 2x10      Ther Activity                        Gait Training                        Modalities

## 2024-12-15 DIAGNOSIS — I10 PRIMARY HYPERTENSION: ICD-10-CM

## 2024-12-16 RX ORDER — METOPROLOL SUCCINATE 25 MG/1
25 TABLET, EXTENDED RELEASE ORAL DAILY
Qty: 90 TABLET | Refills: 1 | Status: SHIPPED | OUTPATIENT
Start: 2024-12-16 | End: 2024-12-17 | Stop reason: SDUPTHER

## 2024-12-17 ENCOUNTER — OFFICE VISIT (OUTPATIENT)
Dept: PHYSICAL THERAPY | Facility: CLINIC | Age: 49
End: 2024-12-17
Payer: COMMERCIAL

## 2024-12-17 DIAGNOSIS — M48.07 SPINAL STENOSIS OF LUMBOSACRAL REGION: ICD-10-CM

## 2024-12-17 DIAGNOSIS — G89.29 CHRONIC LEFT-SIDED LOW BACK PAIN, UNSPECIFIED WHETHER SCIATICA PRESENT: ICD-10-CM

## 2024-12-17 DIAGNOSIS — M54.50 CHRONIC LEFT-SIDED LOW BACK PAIN, UNSPECIFIED WHETHER SCIATICA PRESENT: ICD-10-CM

## 2024-12-17 DIAGNOSIS — M79.7 FIBROMYALGIA: Primary | ICD-10-CM

## 2024-12-17 DIAGNOSIS — M96.1 FAILED BACK SYNDROME: ICD-10-CM

## 2024-12-17 DIAGNOSIS — I10 PRIMARY HYPERTENSION: ICD-10-CM

## 2024-12-17 DIAGNOSIS — M54.16 LUMBAR RADICULOPATHY: ICD-10-CM

## 2024-12-17 PROCEDURE — 97110 THERAPEUTIC EXERCISES: CPT

## 2024-12-17 PROCEDURE — 97112 NEUROMUSCULAR REEDUCATION: CPT

## 2024-12-17 RX ORDER — METOPROLOL SUCCINATE 25 MG/1
25 TABLET, EXTENDED RELEASE ORAL DAILY
Qty: 90 TABLET | Refills: 1 | Status: SHIPPED | OUTPATIENT
Start: 2024-12-17

## 2024-12-17 NOTE — TELEPHONE ENCOUNTER
Reason for call:   [x] Refill   [] Prior Auth  [x] Other: Pharmacy told pt they never received the Rx sent 12/16/24 please resend     Office:   [x] PCP/Provider -   [] Specialty/Provider -     Medication:     metoprolol succinate (TOPROL-XL) 25 mg 24 hr KISHORE 1 TABLET(25 MG) BY MOUTH DAILY          Pharmacy: [facility]     Does the patient have enough for 3 days?   [] Yes   [x] No - Send as HP to POD
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
## 2024-12-18 ENCOUNTER — APPOINTMENT (OUTPATIENT)
Dept: RADIOLOGY | Facility: CLINIC | Age: 49
End: 2024-12-18
Payer: COMMERCIAL

## 2024-12-18 ENCOUNTER — OFFICE VISIT (OUTPATIENT)
Age: 49
End: 2024-12-18
Payer: COMMERCIAL

## 2024-12-18 VITALS
HEART RATE: 72 BPM | SYSTOLIC BLOOD PRESSURE: 119 MMHG | HEIGHT: 68 IN | BODY MASS INDEX: 28.79 KG/M2 | DIASTOLIC BLOOD PRESSURE: 81 MMHG | WEIGHT: 190 LBS

## 2024-12-18 DIAGNOSIS — Z01.818 PREOPERATIVE CLEARANCE: ICD-10-CM

## 2024-12-18 DIAGNOSIS — M79.672 FOOT PAIN, LEFT: ICD-10-CM

## 2024-12-18 DIAGNOSIS — M96.0 NONUNION AFTER ARTHRODESIS: ICD-10-CM

## 2024-12-18 DIAGNOSIS — M20.42 HAMMERTOE OF LEFT FOOT: ICD-10-CM

## 2024-12-18 DIAGNOSIS — M20.42 HAMMERTOE OF LEFT FOOT: Primary | ICD-10-CM

## 2024-12-18 PROCEDURE — 99213 OFFICE O/P EST LOW 20 MIN: CPT | Performed by: STUDENT IN AN ORGANIZED HEALTH CARE EDUCATION/TRAINING PROGRAM

## 2024-12-18 PROCEDURE — 73630 X-RAY EXAM OF FOOT: CPT

## 2024-12-18 RX ORDER — CHLORHEXIDINE GLUCONATE ORAL RINSE 1.2 MG/ML
15 SOLUTION DENTAL ONCE
OUTPATIENT
Start: 2024-12-18 | End: 2024-12-18

## 2024-12-18 RX ORDER — CHLORHEXIDINE GLUCONATE 40 MG/ML
SOLUTION TOPICAL DAILY PRN
OUTPATIENT
Start: 2024-12-18

## 2024-12-18 NOTE — PROGRESS NOTES
[facility] Podiatric Medicine and Surgery Office Visit    ASSESSMENT     Diagnoses and all orders for this visit:    Hammertoe of left foot  -     XR foot 3+ vw left; Future  -     Case request operating room: Repair 3rd hammertoe left foot; Standing  -     Case request operating room: Repair 3rd hammertoe left foot    Foot pain, left  -     XR foot 3+ vw left; Future    Preoperative clearance  -     Ambulatory referral to Family Practice; Future  -     CBC and Platelet; Future  -     Comprehensive metabolic panel; Future    Nonunion after arthrodesis    Other orders  -     Nursing Communication Warmimg Interventions Implemented; Standing  -     Nursing Communication CHG bath, have staff wash entire body (neck down) per pre-op bathing protocol. Routine, evening prior to, and day of surgery.; Standing  -     Nursing Communication Swab both nares with Povidone-Iodine solution, EXCLUDE if patient has shellfish/Iodine allergy, and replace with nasal alcohol swabstick. Routine, day of surgery, on call to OR; Standing  -     chlorhexidine (PERIDEX) 0.12 % oral rinse 15 mL  -     Void on call to OR; Standing  -     Insert peripheral IV; Standing  -     Diet NPO; Sips with meds; Standing  -     ceFAZolin (ANCEF) 2,000 mg in sodium chloride 0.9 % 50 mL IVPB  -     chlorhexidine gluconate (HIBICLENS) 4 % topical liquid               Problem List Items Addressed This Visit    None  Visit Diagnoses         Hammertoe of left foot    -  Primary    Relevant Orders    XR foot 3+ vw left (Completed)    Case request operating room: Repair 3rd hammertoe left foot (Completed)      Foot pain, left        Relevant Orders    XR foot 3+ vw left (Completed)      Preoperative clearance        Relevant Orders    Ambulatory referral to Family Practice    CBC and Platelet    Comprehensive metabolic panel      Nonunion after arthrodesis              PLAN  -Moody and I discussed her left foot  -At this point given continued pain to the left  third digit proximal interphalangeal joint, as well as clear nonunion visualized on radiographs, I did recommend surgical intervention as an option at this time.  The patient was agreeable to this course of action.  We did discuss all risks, alternatives, and possible complications.  Surgical intervention will include revisional arthrodesis of the third proximal interphalangeal joint with use of screw fixation.  Additional consent discussion as below  -Patient will be required to be minimal weightbearing to the left foot with a surgical shoe post procedure  -Follow-up PCP clearance, CBC, CMP    Left foot x-ray from 12/18/2024 interpreted independently: Nonunion visualized at the third proximal interphalangeal joint.    Written Consent statement:  I was very clear at the beginning of the discussion about alternatives to this surgery including benign neglect, bracing, and second surgical opinions.   I spent time to discuss with the patient the surgical procedure(s) as left third digit hammertoe repair with screw fixation, and post-op course required to properly heal the surgery. I discussed risks as infection, scar, swelling, chronic pain, painful or prominent hardware (if used), possible need to remove hardware (if used), poor healing of incision or bone that could require more surgery, incomplete correction of deformities or recurrence of deformities, change in shape of foot, toe, or walking and function, numbness, neuritis/RSD, blood clots in the leg or lung, and even death from anesthesia complications. No guarantees were given and the possibility of recurrent deformity or incomplete correction were discussed before patient signed the consent form. We also discussed the need for possible anticoagulation. The offloading device necessary after the surgery will be a surgical shoe.      SUBJECTIVE    Chief Complaint:  Status post left Weil osteotomy with hammertoe repair third digit with continued pain     Patient ID:  "Moody Garrido     11/20/2024: Moody is doing well today. She states that she is having pain and soreness to the plantar aspect of her left foot.  She also states that she is unable to move her left third digit the same as her other toes.  This is concerning to her.    12/18/2024: Moody is overall doing well today. She states that the tops part of her left foot is still very painful. She states that it starts hurting her the most is about longterm through her day and when she has shoes on.       The following portions of the patient's history were reviewed and updated as appropriate: allergies, current medications, past family history, past medical history, past social history, past surgical history and problem list.    Review of Systems   Constitutional: Negative.    HENT: Negative.     Respiratory: Negative.     Cardiovascular: Negative.    Gastrointestinal: Negative.    Musculoskeletal: Negative.    Skin: Negative.    Neurological: Negative.          OBJECTIVE      /81   Pulse 72   Ht 5' 8\" (1.727 m)   Wt 86.2 kg (190 lb)   LMP 08/15/2023 (Approximate) Comment: post menapausal  BMI 28.89 kg/m²        Physical Exam  Constitutional:       Appearance: Normal appearance.   HENT:      Head: Normocephalic and atraumatic.   Eyes:      General:         Right eye: No discharge.         Left eye: No discharge.   Cardiovascular:      Rate and Rhythm: Normal rate and regular rhythm.      Pulses:           Dorsalis pedis pulses are 2+ on the right side and 2+ on the left side.        Posterior tibial pulses are 2+ on the right side and 2+ on the left side.   Pulmonary:      Effort: Pulmonary effort is normal.      Breath sounds: Normal breath sounds.   Skin:     General: Skin is warm.      Capillary Refill: Capillary refill takes less than 2 seconds.   Neurological:      Sensory: Sensation is intact. No sensory deficit.         Vascular:  -DP and PT pulses intact b/l  -Capillary refill time <2 sec b/l  -Digital " hair growth: Present  -Skin temp: WNL    MSK:  -Pain on palpation to the dorsal aspect of the third digit at the level of the proximal interphalangeal joint.  No pain on palpation to the dorsal or plantar aspect of the third metatarsal head today  -No gross deformities noted  -MMT is 5/5 to all muscle compartments of the lower extremity  -Ankle dorsiflexion >10 degrees with knee extended and knee flexed b/l  -There is dorsiflexion and plantarflexion range of motion noted at the third proximal interphalangeal joint    Neuro:  -Light sensation intact bilaterally  -Protective sensation intact bilaterally    Derm:  -No lesions, abrasions, or open wounds noted  -No noted interdigital maceration, peeling, malodor  -No callus formation noted on exam  -All incision sites remain fully healed at this time without complication.

## 2024-12-19 ENCOUNTER — OFFICE VISIT (OUTPATIENT)
Dept: PHYSICAL THERAPY | Facility: CLINIC | Age: 49
End: 2024-12-19
Payer: COMMERCIAL

## 2024-12-19 DIAGNOSIS — M96.1 FAILED BACK SYNDROME: ICD-10-CM

## 2024-12-19 DIAGNOSIS — M54.16 LUMBAR RADICULOPATHY: ICD-10-CM

## 2024-12-19 DIAGNOSIS — M48.07 SPINAL STENOSIS OF LUMBOSACRAL REGION: ICD-10-CM

## 2024-12-19 DIAGNOSIS — M54.50 CHRONIC LEFT-SIDED LOW BACK PAIN, UNSPECIFIED WHETHER SCIATICA PRESENT: ICD-10-CM

## 2024-12-19 DIAGNOSIS — G89.29 CHRONIC LEFT-SIDED LOW BACK PAIN, UNSPECIFIED WHETHER SCIATICA PRESENT: ICD-10-CM

## 2024-12-19 DIAGNOSIS — M79.7 FIBROMYALGIA: Primary | ICD-10-CM

## 2024-12-19 PROCEDURE — 97112 NEUROMUSCULAR REEDUCATION: CPT

## 2024-12-19 NOTE — PROGRESS NOTES
Daily Note     Today's date: 2024  Patient name: Moody Garrido  : 1975  MRN: [id number]  Referring provider: Prabhakar Mao MD  Dx:   Encounter Diagnosis     ICD-10-CM    1. Fibromyalgia  M79.7       2. Chronic left-sided low back pain, unspecified whether sciatica present  M54.50     G89.29       3. Spinal stenosis of lumbosacral region  M48.07       4. Failed back syndrome  M96.1       5. Lumbar radiculopathy  M54.16           Start Time: 802  Stop Time: 847  Total time in clinic (min): 45 minutes    Subjective: Pt reports that her back is sore from her last session and continues to have her normal pain, but overall felt fine after her last treatment session. Pt will not have her truck for the next two week so she is going to take a hiatus from PT until then and will continue her HEP.       Objective: See treatment diary below      Assessment: Tolerated treatment well. Patient demonstrated fatigue post treatment, exhibited good technique with therapeutic exercises, and would benefit from continued PT      Plan: Continue per plan of care.  Progress treatment as tolerated.          Insurance:  AMA/CMS Eval/ Re-eval Auth #/ Referral # Total units or visits Start date  Expiration date KX? Visit limitation?  PT only or  PT+OT? Co-Insurance   CMS 10/22/24 [id number] 10/22/24 12/17/25    $25 copay                 POC Start Date POC Expiration Date Signed POC?   10/22/24  12/10 12/17/24  1/20 PENDING      Date 10/22 10/29 11/12 11/14 11/21 12/6 12/10 12/12 12/17      Visits/Units:  Used 1 2 3 4 5 6 7 8 9      Authed:  Remaining  15 14 13 12 11 10 9 8 7             Date     Visits/Units:  Used 1    Authed: 8 Remaining  7      Precautions: hx of spinal fusion, recent foot surgery in boot (WBAT)  HEP: Access Code [email or web address]     Date 12/19 12/17 12/12 12/10 12/6 11/21   Visit Number [phone]   Manuals         AP mobilizations      Grade 2 cervical and thoracic                                Neuro  Re-Ed          Prone hip ext 1x10 B w/knee ext  1x10 B w/knee flex 1x10 B w/knee ext  1x10 B w/knee flex 2x10 B      Prone hip IR/ER 2x10 B    1x10 w/2lbs B 2x10 B 2x10 B      Sup sciatic tensioners 2x10 B 2x10 B 2x10 B      Bent knee fallout      W/TA and RTB 2x10 B m   TA w/ball curl supine 2x10 3x10       SLR w/TA 2x10 B 2x10 B    2x10 B   Bridge  On pball 2x10 3x10  1x15 3s  1x15, 3s   STS W/RTB 1x10        TA HS curl on pball      3x10   TA Marches supine       2x10 B   Cat cow       2x10   Prone extension          Paloff walk outs         Ther Ex         FEMI 3 mins 3 min 3 min 3 min     Press ups  X30 belt OP X20 pt OP- better  X20 belt OP- better X20  X20 pt OP - reduced pain  X20 belt OP - reducd pain X15    x15 x10   Standing extension against table    Belt ext snag  2x10 initial reduction then increased pain      Piriformis str         YARELI str     2x30s     Modified chayito str     2x30s B     LTR   2x10  2x10     Open books     Sidelying 2x10     Cervical extension prone      x15    snag     2x10    Chin tuck ext      Towel 2x10     Ther Activity                           Gait Training                           Modalities

## 2024-12-20 ENCOUNTER — NURSE TRIAGE (OUTPATIENT)
Age: 49
End: 2024-12-20

## 2024-12-20 ENCOUNTER — TELEPHONE (OUTPATIENT)
Age: 49
End: 2024-12-20

## 2024-12-20 DIAGNOSIS — N30.10 IC (INTERSTITIAL CYSTITIS): Primary | ICD-10-CM

## 2024-12-20 RX ORDER — NITROFURANTOIN 25; 75 MG/1; MG/1
100 CAPSULE ORAL 2 TIMES DAILY
Qty: 10 CAPSULE | Refills: 0 | Status: SHIPPED | OUTPATIENT
Start: 2024-12-20 | End: 2024-12-25

## 2024-12-20 NOTE — TELEPHONE ENCOUNTER
Roseann or Dr Varghese, see previous message.  Patient sees Dr Gonsalez.  Would you be willing to call in rx? Vinnie Roque

## 2024-12-20 NOTE — TELEPHONE ENCOUNTER
Patient called in regarding start of UTI symptoms 12/19 PM. States has strong odor, burning upon urination , and an increase in frequency. Patient has history of interstitial cystitis (will be seeing Urology 1/6/24 for frequent UTI's). Was instructed by PCP to call and let her know if symptoms start and provider would send in abx for her. Please f/u with patient with provider response.

## 2025-01-01 DIAGNOSIS — I10 PRIMARY HYPERTENSION: ICD-10-CM

## 2025-01-01 RX ORDER — HYDROCHLOROTHIAZIDE 12.5 MG/1
TABLET ORAL
Qty: 90 TABLET | OUTPATIENT
Start: 2025-01-01

## 2025-01-09 ENCOUNTER — APPOINTMENT (OUTPATIENT)
Dept: LAB | Facility: CLINIC | Age: 50
End: 2025-01-09
Payer: COMMERCIAL

## 2025-01-09 DIAGNOSIS — Z01.818 PREOPERATIVE CLEARANCE: ICD-10-CM

## 2025-01-09 LAB
ALBUMIN SERPL BCG-MCNC: 4.3 G/DL (ref 3.5–5)
ALP SERPL-CCNC: 45 U/L (ref 34–104)
ALT SERPL W P-5'-P-CCNC: 19 U/L (ref 7–52)
ANION GAP SERPL CALCULATED.3IONS-SCNC: 11 MMOL/L (ref 4–13)
AST SERPL W P-5'-P-CCNC: 20 U/L (ref 13–39)
BILIRUB SERPL-MCNC: 0.73 MG/DL (ref 0.2–1)
BUN SERPL-MCNC: 18 MG/DL (ref 5–25)
CALCIUM SERPL-MCNC: 9.1 MG/DL (ref 8.4–10.2)
CHLORIDE SERPL-SCNC: 105 MMOL/L (ref 96–108)
CO2 SERPL-SCNC: 24 MMOL/L (ref 21–32)
CREAT SERPL-MCNC: 0.63 MG/DL (ref 0.6–1.3)
ERYTHROCYTE [DISTWIDTH] IN BLOOD BY AUTOMATED COUNT: 12.6 % (ref 11.6–15.1)
GFR SERPL CREATININE-BSD FRML MDRD: 104 ML/MIN/1.73SQ M
GLUCOSE P FAST SERPL-MCNC: 86 MG/DL (ref 65–99)
HCT VFR BLD AUTO: 39.9 % (ref 34.8–46.1)
HGB BLD-MCNC: 13.2 G/DL (ref 11.5–15.4)
MCH RBC QN AUTO: 32.3 PG (ref 26.8–34.3)
MCHC RBC AUTO-ENTMCNC: 33.1 G/DL (ref 31.4–37.4)
MCV RBC AUTO: 98 FL (ref 82–98)
PLATELET # BLD AUTO: 324 THOUSANDS/UL (ref 149–390)
PMV BLD AUTO: 8.9 FL (ref 8.9–12.7)
POTASSIUM SERPL-SCNC: 4.3 MMOL/L (ref 3.5–5.3)
PROT SERPL-MCNC: 6.8 G/DL (ref 6.4–8.4)
RBC # BLD AUTO: 4.09 MILLION/UL (ref 3.81–5.12)
SODIUM SERPL-SCNC: 140 MMOL/L (ref 135–147)
WBC # BLD AUTO: 5.5 THOUSAND/UL (ref 4.31–10.16)

## 2025-01-09 PROCEDURE — 36415 COLL VENOUS BLD VENIPUNCTURE: CPT

## 2025-01-09 PROCEDURE — 85027 COMPLETE CBC AUTOMATED: CPT

## 2025-01-09 PROCEDURE — 80053 COMPREHEN METABOLIC PANEL: CPT

## 2025-01-14 DIAGNOSIS — M54.16 RADICULOPATHY, LUMBAR REGION: ICD-10-CM

## 2025-01-15 ENCOUNTER — CONSULT (OUTPATIENT)
Dept: FAMILY MEDICINE CLINIC | Facility: CLINIC | Age: 50
End: 2025-01-15
Payer: COMMERCIAL

## 2025-01-15 VITALS
HEIGHT: 68 IN | WEIGHT: 192 LBS | SYSTOLIC BLOOD PRESSURE: 126 MMHG | DIASTOLIC BLOOD PRESSURE: 82 MMHG | OXYGEN SATURATION: 100 % | BODY MASS INDEX: 29.1 KG/M2 | RESPIRATION RATE: 16 BRPM | TEMPERATURE: 97.7 F | HEART RATE: 70 BPM

## 2025-01-15 DIAGNOSIS — I10 PRIMARY HYPERTENSION: ICD-10-CM

## 2025-01-15 DIAGNOSIS — Z01.818 PREOPERATIVE CLEARANCE: ICD-10-CM

## 2025-01-15 DIAGNOSIS — M20.32 HALLUX HAMMERTOE, LEFT: Primary | ICD-10-CM

## 2025-01-15 DIAGNOSIS — Z01.818 PRE-OPERATIVE CLEARANCE: ICD-10-CM

## 2025-01-15 DIAGNOSIS — M54.2 CERVICALGIA: ICD-10-CM

## 2025-01-15 PROBLEM — R03.0 ELEVATED BLOOD-PRESSURE READING WITHOUT DIAGNOSIS OF HYPERTENSION: Status: RESOLVED | Noted: 2024-02-29 | Resolved: 2025-01-15

## 2025-01-15 PROCEDURE — 99214 OFFICE O/P EST MOD 30 MIN: CPT | Performed by: STUDENT IN AN ORGANIZED HEALTH CARE EDUCATION/TRAINING PROGRAM

## 2025-01-15 RX ORDER — GABAPENTIN 300 MG/1
CAPSULE ORAL
Qty: 60 CAPSULE | Refills: 0 | Status: SHIPPED | OUTPATIENT
Start: 2025-01-15

## 2025-01-15 NOTE — PROGRESS NOTES
Pre-operative Clearance  Name: Moody Garrido      : 1975      MRN: [id number]  Encounter Provider: Katarina Gonsalez MD  Encounter Date: 1/15/2025   Encounter department: [facility]    Assessment & Plan  Hallux maryanertoe, left  -Repair of hammertoe on 25 with Dr. Ramsey       Primary hypertension  -Stable, continue Toprol XL 25 mg daily and Altace 10 mg daily       Cervicalgia  -Continue gabapentin 300 mg daily       Pre-operative clearance         Preoperative clearance    Orders:  •  Ambulatory referral to Family Practice    Pre-operative Clearance:     Revised Cardiac Risk Index:  RCI RISK CLASS I (0 risk factors, risk of major cardiac complications approximately 0.5%)    Clearance:  Patient is medically optimized (CLEARED) for proposed surgery without any additional cardiac testing.      Medication Instructions:   - Avoid herbs or non-directed vitamins one week prior to surgery    - Avoid aspirin containing medications or non-steroidal anti-inflammatory drugs one week preceding surgery    - May take tylenol for pain up until the night before surgery    - ACE Inhibitors or ARBs: Continue this medication up to the evening before surgery/procedure, but do not take the morning of the day of surgery.  - Antiepileptic meds: Continue to take this medication on your normal schedule.  - Beta blockers:  Continue to take this medication on your normal schedule.  - Hormone replacement therapy: Continue to take this medication on your normal schedule. This medication may need to be discontinued for at least 4 weeks prior to surgery if the surgical procedure is associated with high risk of blood clots. Consult with your surgeon.      Depression Screening and Follow-up Plan: Patient's depression screening was positive with a PHQ-2 score of 6. Their PHQ-9 score was 21.   Patient assessed for underlying major depression. Brief counseling provided and recommend additional  follow-up/re-evaluation next office visit.     History of Present Illness     HPI    Patient presents for pre op appt. Notes ongoing pain with left toe post surgery.     Review of Systems   Constitutional:  Negative for activity change, chills, diaphoresis, fatigue and fever.   HENT:  Negative for congestion, postnasal drip, rhinorrhea and sore throat.    Respiratory:  Negative for cough, shortness of breath and wheezing.    Cardiovascular:  Negative for chest pain, palpitations and leg swelling.   Gastrointestinal:  Negative for abdominal pain, constipation, diarrhea, nausea and vomiting.   Musculoskeletal:  Positive for arthralgias, myalgias and neck pain.   Skin:  Negative for rash.   Neurological:  Negative for weakness, light-headedness and headaches.   Psychiatric/Behavioral:  The patient is not nervous/anxious.      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   • Vaginosis      Past Surgical History:   Procedure Laterality Date   • ABDOMINAL SURGERY      second surgery for endometriosis   • BACK SURGERY     • CHOLECYSTECTOMY     • LAPAROSCOPIC ENDOMETRIOSIS FULGURATION     • SD CORRECTION HAMMERTOE Left 9/24/2024    Procedure: REPAIR HAMMERTOE third digit;  Surgeon: Lalo Ramsey DPM;  Location: [facility];  Service: Podiatry   • SD OSTEOT W/WO LNGTH SHRT/CORRJ METAR XCP 1ST EA Left 9/24/2024    Procedure: Weil osteotomy third metatarsal;  Surgeon: Lalo Ramsey DPM;  Location: [facility];  Service: Podiatry     Family History   Problem Relation Age of Onset   • Heart disease Mother    • Diabetes Mother    • Heart disease Father    • Diabetes Father      Social History  "    Tobacco Use   • Smoking status: Never     Passive exposure: Past   • Smokeless tobacco: Never   Vaping Use   • Vaping status: Never Used   Substance and Sexual Activity   • Alcohol use: Yes     Alcohol/week: 7.0 standard drinks of alcohol     Types: 7 Glasses of wine per week     Comment: daily   • Drug use: Yes     Types: Marijuana     Comment: daily   • Sexual activity: Not on file     Current Outpatient Medications on File Prior to Visit   Medication Sig   • clobetasol (TEMOVATE) 0.05 % ointment    • cyclobenzaprine (FLEXERIL) 10 mg tablet Take 1 tablet (10 mg total) by mouth 3 (three) times a day as needed for muscle spasms   • estradiol (ESTRACE) 0.1 mg/g vaginal cream    • gabapentin (NEURONTIN) 300 mg capsule TAKE 1 CAPSULE(300 MG) BY MOUTH TWICE DAILY. START WITH 1 TABLET EVERY NIGHT FOR 5 NIGHTS AND THEN. INCREASE TO 2 TIMES DAILY FROM THERE   • metoprolol succinate (TOPROL-XL) 25 mg 24 hr tablet Take 1 tablet (25 mg total) by mouth daily   • omeprazole (PriLOSEC) 40 MG capsule Take 40 mg by mouth daily   • phenazopyridine (PYRIDIUM) 95 MG tablet Take 190 mg by mouth 3 (three) times a day as needed for bladder spasms   • ramipril (ALTACE) 10 MG capsule Take 1 capsule (10 mg total) by mouth daily   • sucralfate (CARAFATE) 1 g tablet Take 1 g by mouth 4 (four) times a day   • [DISCONTINUED] gabapentin (NEURONTIN) 300 mg capsule TAKE 1 CAPSULE(300 MG) BY MOUTH TWICE DAILY. START WITH 1 TABLET EVERY NIGHT FOR 5 NIGHTS AND THEN. INCREASE TO 2 TIMES DAILY FROM THERE     No Known Allergies  Objective   /82 (BP Location: Left arm, Patient Position: Sitting, Cuff Size: Standard)   Pulse 70   Temp 97.7 °F (36.5 °C) (Temporal)   Resp 16   Ht 5' 8\" (1.727 m)   Wt 87.1 kg (192 lb)   LMP 08/15/2023 (Approximate) Comment: post menapausal  SpO2 100%   BMI 29.19 kg/m²     Physical Exam  Constitutional:       Appearance: Normal appearance.   HENT:      Head: Normocephalic and atraumatic.   Cardiovascular: "      Rate and Rhythm: Normal rate and regular rhythm.      Pulses: Normal pulses.      Heart sounds: Normal heart sounds.   Pulmonary:      Effort: Pulmonary effort is normal.      Breath sounds: Normal breath sounds.   Musculoskeletal:        Feet:    Neurological:      General: No focal deficit present.      Mental Status: She is alert and oriented to person, place, and time.   Psychiatric:         Mood and Affect: Mood normal.         Behavior: Behavior normal.         Thought Content: Thought content normal.         Judgment: Judgment normal.           Katarina Gonsalez MD

## 2025-01-20 ENCOUNTER — ANESTHESIA EVENT (OUTPATIENT)
Dept: PERIOP | Facility: HOSPITAL | Age: 50
End: 2025-01-20
Payer: COMMERCIAL

## 2025-01-20 PROBLEM — K21.9 GASTROESOPHAGEAL REFLUX DISEASE: Status: ACTIVE | Noted: 2025-01-20

## 2025-01-20 NOTE — ANESTHESIA PREPROCEDURE EVALUATION
Procedure:  Repair 3rd hammertoe left foot (Left: Foot)    Relevant Problems   ANESTHESIA   (+) PONV (postoperative nausea and vomiting)      CARDIO   (+) Primary hypertension      GI/HEPATIC   (+) Gastroesophageal reflux disease      MUSCULOSKELETAL  Lumbar hardware   (+) Failed back syndrome   (+) Other intervertebral disc degeneration, lumbar region   (+) Spondylosis without myelopathy or radiculopathy, lumbosacral region      Surgery/Wound/Pain   (+) Postlaminectomy syndrome        Physical Exam    Airway    Mallampati score: II  TM Distance: >3 FB  Neck ROM: full     Dental    lower dentures    Cardiovascular  Rhythm: regular, Rate: normal    Pulmonary   Breath sounds clear to auscultation    Other Findings  post-pubertal.      Anesthesia Plan  ASA Score- 2     Anesthesia Type- general with ASA Monitors.         Additional Monitors:     Airway Plan: LMA.    Comment: Pre-procedure scopolamine patch for PONV.       Plan Factors-    Chart reviewed.        Patient is not a current smoker.              Induction- intravenous.    Postoperative Plan- Plan for postoperative opioid use.     Perioperative Resuscitation Plan - Level 1 - Full Code.       Informed Consent- Anesthetic plan and risks discussed with patient.  I personally reviewed this patient with the CRNA. Discussed and agreed on the Anesthesia Plan with the CRNA..      NPO Status:  No vitals data found for the desired time range.

## 2025-01-20 NOTE — PRE-PROCEDURE INSTRUCTIONS
Pre-Surgery Instructions:   Medication Instructions    clobetasol (TEMOVATE) 0.05 % ointment Hold day of surgery.    estradiol (ESTRACE) 0.1 mg/g vaginal cream Hold day of surgery.    gabapentin (NEURONTIN) 300 mg capsule Take day of surgery.    metoprolol succinate (TOPROL-XL) 25 mg 24 hr tablet Take day of surgery.    omeprazole (PriLOSEC) 40 MG capsule Uses PRN- OK to take day of surgery    phenazopyridine (PYRIDIUM) 95 MG tablet Uses PRN- DO NOT take day of surgery    ramipril (ALTACE) 10 MG capsule Hold day of surgery.    sucralfate (CARAFATE) 1 g tablet Uses PRN- OK to take day of surgery   Medication instructions for day surgery reviewed. Please use only a sip of water to take your instructed medications. Avoid all over the counter vitamins, supplements and NSAIDS for one week prior to surgery per anesthesia guidelines. Tylenol is ok to take as needed.     You will receive a call one business day prior to surgery with an arrival time and hospital directions. If your surgery is scheduled on a Monday, the hospital will be calling you on the Friday prior to your surgery. If you have not heard from anyone by 8pm, please call the hospital supervisor through the hospital  at [phone]. ([place] [phone] or [place] [phone]).    Do not eat or drink anything after midnight the night before your surgery, including candy, mints, lifesavers, or chewing gum. Do not drink alcohol 24hrs before your surgery. Try not to smoke at least 24hrs before your surgery.       Follow the pre surgery showering instructions as listed in the “My Surgical Experience Booklet” or otherwise provided by your surgeon's office. Do not use a blade to shave the surgical area 1 week before surgery. It is okay to use a clean electric clippers up to 24 hours before surgery. Do not apply any lotions, creams, including makeup, cologne, deodorant, or perfumes after showering on the day of your surgery. Do not use dry shampoo, hair  spray, hair gel, or any type of hair products.     No contact lenses, eye make-up, or artificial eyelashes. Remove nail polish, including gel polish, and any artificial, gel, or acrylic nails if possible. Remove all jewelry including rings and body piercing jewelry.     Wear causal clothing that is easy to take on and off. Consider your type of surgery.    Keep any valuables, jewelry, piercings at home. Please bring any specially ordered equipment (sling, braces) if indicated.    Arrange for a responsible person to drive you to and from the hospital on the day of your surgery. Please confirm the visitor policy for the day of your procedure when you receive your phone call with an arrival time.     Call the surgeon's office with any new illnesses, exposures, or additional questions prior to surgery.    Please reference your “My Surgical Experience Booklet” for additional information to prepare for your upcoming surgery.      Pt. Verbalized an understanding of all instructions reviewed and offers no concerns at this time.

## 2025-01-21 ENCOUNTER — ANESTHESIA (OUTPATIENT)
Dept: PERIOP | Facility: HOSPITAL | Age: 50
End: 2025-01-21
Payer: COMMERCIAL

## 2025-01-21 ENCOUNTER — HOSPITAL ENCOUNTER (OUTPATIENT)
Dept: RADIOLOGY | Facility: HOSPITAL | Age: 50
Discharge: HOME | End: 2025-01-21
Payer: COMMERCIAL

## 2025-01-21 ENCOUNTER — HOSPITAL ENCOUNTER (OUTPATIENT)
Facility: HOSPITAL | Age: 50
Setting detail: OUTPATIENT SURGERY
Discharge: HOME | End: 2025-01-21
Attending: STUDENT IN AN ORGANIZED HEALTH CARE EDUCATION/TRAINING PROGRAM | Admitting: STUDENT IN AN ORGANIZED HEALTH CARE EDUCATION/TRAINING PROGRAM
Payer: COMMERCIAL

## 2025-01-21 ENCOUNTER — APPOINTMENT (OUTPATIENT)
Dept: RADIOLOGY | Facility: HOSPITAL | Age: 50
End: 2025-01-21
Payer: COMMERCIAL

## 2025-01-21 VITALS
HEIGHT: 68 IN | SYSTOLIC BLOOD PRESSURE: 128 MMHG | TEMPERATURE: 98 F | RESPIRATION RATE: 16 BRPM | WEIGHT: 197.09 LBS | BODY MASS INDEX: 29.87 KG/M2 | HEART RATE: 73 BPM | DIASTOLIC BLOOD PRESSURE: 66 MMHG | OXYGEN SATURATION: 96 %

## 2025-01-21 DIAGNOSIS — Z98.890 S/P HARDWARE REMOVAL: ICD-10-CM

## 2025-01-21 DIAGNOSIS — G89.18 POST-OPERATIVE PAIN: Primary | ICD-10-CM

## 2025-01-21 PROCEDURE — 28285 REPAIR OF HAMMERTOE: CPT | Performed by: STUDENT IN AN ORGANIZED HEALTH CARE EDUCATION/TRAINING PROGRAM

## 2025-01-21 PROCEDURE — NC001 PR NO CHARGE: Performed by: STUDENT IN AN ORGANIZED HEALTH CARE EDUCATION/TRAINING PROGRAM

## 2025-01-21 PROCEDURE — 73620 X-RAY EXAM OF FOOT: CPT

## 2025-01-21 PROCEDURE — 20680 REMOVAL OF IMPLANT DEEP: CPT | Performed by: STUDENT IN AN ORGANIZED HEALTH CARE EDUCATION/TRAINING PROGRAM

## 2025-01-21 PROCEDURE — C1713 ANCHOR/SCREW BN/BN,TIS/BN: HCPCS | Performed by: STUDENT IN AN ORGANIZED HEALTH CARE EDUCATION/TRAINING PROGRAM

## 2025-01-21 PROCEDURE — 99024 POSTOP FOLLOW-UP VISIT: CPT | Performed by: STUDENT IN AN ORGANIZED HEALTH CARE EDUCATION/TRAINING PROGRAM

## 2025-01-21 DEVICE — IMPLANTABLE DEVICE: Type: IMPLANTABLE DEVICE | Site: FOOT | Status: FUNCTIONAL

## 2025-01-21 RX ORDER — KETOROLAC TROMETHAMINE 30 MG/ML
INJECTION, SOLUTION INTRAMUSCULAR; INTRAVENOUS AS NEEDED
Status: DISCONTINUED | OUTPATIENT
Start: 2025-01-21 | End: 2025-01-21

## 2025-01-21 RX ORDER — SCOPOLAMINE 1 MG/3D
1 PATCH, EXTENDED RELEASE TRANSDERMAL
Status: DISCONTINUED | OUTPATIENT
Start: 2025-01-21 | End: 2025-01-21 | Stop reason: HOSPADM

## 2025-01-21 RX ORDER — SODIUM CHLORIDE, SODIUM LACTATE, POTASSIUM CHLORIDE, CALCIUM CHLORIDE 600; 310; 30; 20 MG/100ML; MG/100ML; MG/100ML; MG/100ML
75 INJECTION, SOLUTION INTRAVENOUS CONTINUOUS
Status: DISCONTINUED | OUTPATIENT
Start: 2025-01-21 | End: 2025-01-21 | Stop reason: HOSPADM

## 2025-01-21 RX ORDER — ONDANSETRON 2 MG/ML
INJECTION INTRAMUSCULAR; INTRAVENOUS AS NEEDED
Status: DISCONTINUED | OUTPATIENT
Start: 2025-01-21 | End: 2025-01-21

## 2025-01-21 RX ORDER — MAGNESIUM HYDROXIDE 1200 MG/15ML
LIQUID ORAL AS NEEDED
Status: DISCONTINUED | OUTPATIENT
Start: 2025-01-21 | End: 2025-01-21 | Stop reason: HOSPADM

## 2025-01-21 RX ORDER — CHLORHEXIDINE GLUCONATE 40 MG/ML
SOLUTION TOPICAL DAILY PRN
Status: DISCONTINUED | OUTPATIENT
Start: 2025-01-21 | End: 2025-01-21 | Stop reason: HOSPADM

## 2025-01-21 RX ORDER — PROPOFOL 10 MG/ML
INJECTION, EMULSION INTRAVENOUS AS NEEDED
Status: DISCONTINUED | OUTPATIENT
Start: 2025-01-21 | End: 2025-01-21

## 2025-01-21 RX ORDER — METOCLOPRAMIDE HYDROCHLORIDE 5 MG/ML
INJECTION INTRAMUSCULAR; INTRAVENOUS AS NEEDED
Status: DISCONTINUED | OUTPATIENT
Start: 2025-01-21 | End: 2025-01-21

## 2025-01-21 RX ORDER — CHLORHEXIDINE GLUCONATE ORAL RINSE 1.2 MG/ML
15 SOLUTION DENTAL ONCE
Status: COMPLETED | OUTPATIENT
Start: 2025-01-21 | End: 2025-01-21

## 2025-01-21 RX ORDER — OXYCODONE HYDROCHLORIDE 5 MG/1
5 TABLET ORAL ONCE AS NEEDED
Refills: 0 | Status: DISCONTINUED | OUTPATIENT
Start: 2025-01-21 | End: 2025-01-21 | Stop reason: HOSPADM

## 2025-01-21 RX ORDER — CEFAZOLIN SODIUM 2 G/50ML
2000 SOLUTION INTRAVENOUS ONCE
Status: COMPLETED | OUTPATIENT
Start: 2025-01-21 | End: 2025-01-21

## 2025-01-21 RX ORDER — FENTANYL CITRATE/PF 50 MCG/ML
25 SYRINGE (ML) INJECTION
Status: DISCONTINUED | OUTPATIENT
Start: 2025-01-21 | End: 2025-01-21 | Stop reason: HOSPADM

## 2025-01-21 RX ORDER — LIDOCAINE HYDROCHLORIDE 10 MG/ML
INJECTION, SOLUTION EPIDURAL; INFILTRATION; INTRACAUDAL; PERINEURAL AS NEEDED
Status: DISCONTINUED | OUTPATIENT
Start: 2025-01-21 | End: 2025-01-21

## 2025-01-21 RX ORDER — DEXAMETHASONE SODIUM PHOSPHATE 10 MG/ML
INJECTION, SOLUTION INTRAMUSCULAR; INTRAVENOUS AS NEEDED
Status: DISCONTINUED | OUTPATIENT
Start: 2025-01-21 | End: 2025-01-21

## 2025-01-21 RX ORDER — FENTANYL CITRATE 50 UG/ML
INJECTION, SOLUTION INTRAMUSCULAR; INTRAVENOUS AS NEEDED
Status: DISCONTINUED | OUTPATIENT
Start: 2025-01-21 | End: 2025-01-21

## 2025-01-21 RX ORDER — ONDANSETRON 2 MG/ML
4 INJECTION INTRAMUSCULAR; INTRAVENOUS ONCE
Status: DISCONTINUED | OUTPATIENT
Start: 2025-01-21 | End: 2025-01-21 | Stop reason: HOSPADM

## 2025-01-21 RX ORDER — MIDAZOLAM HYDROCHLORIDE 2 MG/2ML
INJECTION, SOLUTION INTRAMUSCULAR; INTRAVENOUS AS NEEDED
Status: DISCONTINUED | OUTPATIENT
Start: 2025-01-21 | End: 2025-01-21

## 2025-01-21 RX ORDER — OXYCODONE AND ACETAMINOPHEN 5; 325 MG/1; MG/1
1 TABLET ORAL EVERY 8 HOURS PRN
Qty: 12 TABLET | Refills: 0 | Status: SHIPPED | OUTPATIENT
Start: 2025-01-21

## 2025-01-21 RX ADMIN — METOCLOPRAMIDE 10 MG: 5 INJECTION, SOLUTION INTRAMUSCULAR; INTRAVENOUS at 07:26

## 2025-01-21 RX ADMIN — LIDOCAINE HYDROCHLORIDE 50 MG: 10 INJECTION, SOLUTION EPIDURAL; INFILTRATION; INTRACAUDAL; PERINEURAL at 07:31

## 2025-01-21 RX ADMIN — SODIUM CHLORIDE, SODIUM LACTATE, POTASSIUM CHLORIDE, AND CALCIUM CHLORIDE 75 ML/HR: .6; .31; .03; .02 INJECTION, SOLUTION INTRAVENOUS at 06:38

## 2025-01-21 RX ADMIN — SCOPALAMINE 1 PATCH: 1 PATCH, EXTENDED RELEASE TRANSDERMAL at 06:38

## 2025-01-21 RX ADMIN — PROPOFOL 200 MG: 10 INJECTION, EMULSION INTRAVENOUS at 07:31

## 2025-01-21 RX ADMIN — FENTANYL CITRATE 50 MCG: 50 INJECTION, SOLUTION INTRAMUSCULAR; INTRAVENOUS at 08:39

## 2025-01-21 RX ADMIN — MIDAZOLAM 2 MG: 1 INJECTION INTRAMUSCULAR; INTRAVENOUS at 07:26

## 2025-01-21 RX ADMIN — ONDANSETRON 4 MG: 2 INJECTION INTRAMUSCULAR; INTRAVENOUS at 07:31

## 2025-01-21 RX ADMIN — KETOROLAC TROMETHAMINE 30 MG: 30 INJECTION, SOLUTION INTRAMUSCULAR at 08:42

## 2025-01-21 RX ADMIN — DEXAMETHASONE SODIUM PHOSPHATE 10 MG: 10 INJECTION, SOLUTION INTRAMUSCULAR; INTRAVENOUS at 08:42

## 2025-01-21 RX ADMIN — CHLORHEXIDINE GLUCONATE 0.12% ORAL RINSE 15 ML: 1.2 LIQUID ORAL at 06:38

## 2025-01-21 RX ADMIN — CEFAZOLIN SODIUM 2000 MG: 2 SOLUTION INTRAVENOUS at 07:26

## 2025-01-21 RX ADMIN — FENTANYL CITRATE 50 MCG: 50 INJECTION, SOLUTION INTRAMUSCULAR; INTRAVENOUS at 07:31

## 2025-01-21 NOTE — PERIOPERATIVE NURSING NOTE
Received patient from pacu, aoo vitals stable. Patient resting in bed, bed locked in lowest position.  Patient verbalizes pain to left foot surgical site at 0/10, denies further pain or discomfort and states this pain is manageable and would not like further intervention at this time. Patient given ginger ale and is tolerating sips well without complaint. Patient incision Is CDI, dressing in place, ice applied. Patient resting in bed, call light in reach and environment cleared. Plan of care ongoing.

## 2025-01-21 NOTE — QUICK NOTE
"    H&P Interval Update      H&P performed by Dr. Katarina Gonsalez on 1/15/25 was reviewed.     After examining the patient I find no changes in the patient's condition since the H&P had been written.     /84   Pulse 72   Temp 98 °F (36.7 °C) (Temporal)   Resp 16   Ht 5' 8\" (1.727 m)   Wt 89.4 kg (197 lb 1.5 oz)   LMP 08/15/2023 (Approximate) Comment: post menapausal  SpO2 98%   BMI 29.97 kg/m²             General:  alert, awake, no acute distress   Head: atraumatic  Heart:  regular rate and rhythm  Lungs: Clear to auscultation bilaterally  Abdomen:  soft, non distended, no tenderness to palpation  Extremities:  no erythema, no edema    "

## 2025-01-21 NOTE — PROGRESS NOTES
"Podiatry - Progress Note  Patient: Moody Garrido 49 y.o. female   MRN: [id number]  PCP: Katarina Gonsalez MD  Unit/Bed#: [facility] Encounter: [id number]  Date Of Visit: 25    ASSESSMENT:    Moody Garrido is a 49 y.o. female with:    Left 3rd hammertoe/nonunion      PLAN:    Patient to go to OR today,25, for  left revisional 3rd hammertoe repair.  Consent placed in chart. Signed by both myself as well as the patient  Confirmed NPO status.  H&P, vitals, and current labs reviewed.  No acute changes noted.  Alternatives, risks, and complications discussed with patient.  All questions answered.  No guarantees given to outcome of procedure.  Rest of medical care per primary team.     SUBJECTIVE:     The patient was seen, evaluated, and assessed at bedside today. The patient was awake, alert, and in no acute distress. Patient confirmed NPO status. All questions and concerns regarding the surgical procedure addressed. Patient understands risks vs benefits of procedure and remains amenable with plan for surgery today. Patient denies N/V/F/chills/SOB/CP.    OBJECTIVE:     Vitals:   /84   Pulse 72   Temp 98 °F (36.7 °C) (Temporal)   Resp 16   Ht 5' 8\" (1.727 m)   Wt 89.4 kg (197 lb 1.5 oz)   LMP 08/15/2023 (Approximate) Comment: post menapausal  SpO2 98%   BMI 29.97 kg/m²     Temp (24hrs), Av °F (36.7 °C), Min:98 °F (36.7 °C), Max:98 °F (36.7 °C)      Physical Exam:     General:  Alert, cooperative, and in no distress.  Lower extremity exam:  Cardiovascular status at baseline.  Neurological status at baseline.  Musculoskeletal status at baseline. No calf tenderness noted bilaterally.     Vascular:  -DP and PT pulses intact b/l  -Capillary refill time <2 sec b/l  -Digital hair growth: Present  -Skin temp: WNL     MSK:  -Pain on palpation to the dorsal aspect of the third digit at the level of the proximal interphalangeal joint.  There is also pain on palpation to the dorsal or plantar aspect of the " "third metatarsal head today  -No gross deformities noted  -MMT is 5/5 to all muscle compartments of the lower extremity  -Ankle dorsiflexion >10 degrees with knee extended and knee flexed b/l  -There is dorsiflexion and plantarflexion range of motion noted at the third proximal interphalangeal joint, painful throughout      Neuro:  -Light sensation intact bilaterally  -Protective sensation intact bilaterally     Derm:  -No lesions, abrasions, or open wounds noted  -No noted interdigital maceration, peeling, malodor  -No callus formation noted on exam  -All incision sites remain fully healed at this time without complication.    Additional Data:     Labs:              Invalid input(s): \"LABALBU\"        * I Have Reviewed All Lab Data Listed Above.    Recent Cultures (last 7 days):               Imaging: I have personally reviewed pertinent films in PACS  EKG, Pathology, and Other Studies: I have personally reviewed pertinent reports.    ** Please Note: Portions of the record may have been created with voice recognition software. Occasional wrong word or \"sound a like\" substitutions may have occurred due to the inherent limitations of voice recognition software. Read the chart carefully and recognize, using context, where substitutions have occurred. **      "

## 2025-01-21 NOTE — DISCHARGE SUMMARY
Discharge Summary Outpatient Procedure Podiatry -   Moody Garrido 49 y.o. female MRN: [id number]  Unit/Bed#: OR POOL Encounter: [id number]    Admission Date: 1/21/2025     Admitting Diagnosis: Hammertoe of left foot [M20.42]    Discharge Diagnosis: same    Procedures Performed: Repair 3rd hammertoe left foot: , Removal of hardware left 3rd metatarsal     Complications: none    Condition at Discharge: stable    Discharge instructions/Information to patient and family:   See after visit summary for information provided to patient and family.      Provisions for Follow-Up Care/Important appointments:  See after visit summary for information related to follow-up care and any pertinent home health orders.      Discharge Medications:  See after visit summary for reconciled discharge medications provided to patient and family.

## 2025-01-21 NOTE — DISCHARGE INSTR - AVS FIRST PAGE
[facility]  Dr. Ramsey  Post-Operative Instructions    1. Take your prescribed medication as needed. You can take ibuprofen in between doses of the narcotic if needed.   2. Upon arrival at home, lie down and elevate your surgical foot on 2 pillows.  3. Remain quiet, off your feet as much as possible, for the first 24-48 hours. This is when your feet first swell and may become painful. After 48 hours you may begin limited walking following these restrictions: weight bearing as tolerated in a surgical shoe      4. Drink large quantities of water. Consume no alcohol. Continue a well-balanced diet.  5. Report any unusual discomfort or fever to this office.  6. A limited amount of discomfort and swelling is to be expected. In some cases the skin may take on a bruised appearance. The surgical solution that was applied to your foot prior to the operation is dark in color and the operation site may appear to be oozing when it actually is not.  7. A slight amount of blood is to be expected, and is no cause for alarm. Do not remove the dressings. If there is active bleeding and if the bleeding persists, add additional gauze to the bandage, apply direct pressure, elevate your feet and call this office.  8. Do not get the dressings wet. As regular bathing may be inconvenient, sponge baths are recommended. If you shower, make sure the dressing stays dry.   9. When anesthesia wears off and if any discomfort should be present, apply an ice pack directly over the operated area for 15 minute intervals for several hours or until the pain leaves. (USE IN EXCESS OF 15 MINUTES COULD CAUSE FROSTBITE). Do not use hot water bags or electric pads. A convenient icepack can be made by placing ice cubes in a plastic bag and covering this with a towel.  10. If necessary, take a mild laxative before retiring.  11. Wear your special open shoes anytime you put weight on your foot, even if it is just to walk to the bathroom and back. It  will probably be 2 or 3 weeks before you will be permitted to try regular shoes.  12. Having performed the operation, we are interested in a prompt recovery. Please cooperate by following the above instructions.  13. Please call to confirm your post-op appointment or call with any other questions.

## 2025-01-21 NOTE — PROGRESS NOTES
Patient transferred to APU via stretcher, patient alert and awake, VSS WNL, no pain. Bedside report given to ALISSON Koroma. Stretcher locked in lowest position, side rails up, call bell within reach.

## 2025-01-21 NOTE — ANESTHESIA POSTPROCEDURE EVALUATION
Post-Op Assessment Note    CV Status:  Stable  Pain Score: 0    Pain management: adequate       Mental Status:  Awake   Hydration Status:  Stable   PONV Controlled:  None   Airway Patency:  Patent     Post Op Vitals Reviewed: Yes    No anethesia notable event occurred.    Staff: Anesthesiologist           Last Filed PACU Vitals:  Vitals Value Taken Time   Temp 98 °F (36.7 °C) 01/21/25 0852   Pulse 72 01/21/25 0920   /60 01/21/25 0920   Resp 18 01/21/25 0920   SpO2 96 % 01/21/25 0920       Modified Nury:     Vitals Value Taken Time   Activity 2 01/21/25 0900   Respiration 2 01/21/25 0900   Circulation 2 01/21/25 0900   Consciousness 2 01/21/25 0900   Oxygen Saturation 2 01/21/25 0900     Modified Nury Score: 10

## 2025-01-21 NOTE — OP NOTE
OPERATIVE REPORT  PATIENT NAME: Moody Garrido    :  1975  MRN: [id number]  Pt Location: [facility] OR ROOM 02    SURGERY DATE: 2025    Surgeons and Role:     * Lalo Ramsey DPM - Primary     * Omid Zuniga DPM - Assisting    Preop Diagnosis:  Hammertoe of left foot [M20.42]    Post-Op Diagnosis Codes:     * Hammertoe of left foot [M20.42]    Procedure(s):  Left - Repair 3rd hammertoe left foot  Left-removal of hardware left third metatarsal    Specimen(s):  * No specimens in log *    Estimated Blood Loss:   Minimal    Drains:  * No LDAs found *    Anesthesia Type:   Choice    Operative Indications:  Hammertoe of left foot [M20.42]      Operative Findings:  Consistent with above.      Complications:   None    Procedure and Technique:  Patient was brought back to the operating room under light sedation and transferred to the operating table in the supine position.  After anesthesia was administered a tourniquet was placed on the patient's left lower extremity with ample Webril padding.  The left lower extremity was then prepped and draped in the normal sterile manner.  A preincision timeout was complete with all parties in agreement.  A Esmarch bandage was utilized to exsanguinate the left lower extremity and the tourniquet was inflated to 250 mmhg.    Third hammertoe repair:  Attention was then directed to the 3rd toe. A hammertoe deformity was present. A  dorsal linear incision was made from the metatarsal- phalangeal joint to the proximal interphalangeal joint. The incision was then deepened via sharp dissection through the subcutaneous tissues, ligating all venous vessels as necessary. Dissection was carried to the level of the EDL tendon. The tendon was then transected at that level. The PIPJ was then exposed and the medial and lateral collateral ligaments were incised to expose the head of the proximal phalanx. By use of sagittal saw, the head of the proximal phalanx was resected.  A sagittal  saw was used to remove the articular cartilage off of the base of the middle phalange. Push test showed excellent reduction of hammertoe deformity. At this time, a headless compression screw from [employer] served as fixation.  C-arm evaluation noted excellent positioning of the hardware.    Removal of hardware:  Attention was then drawn back to the third metatarsal phalangeal joint.  Blunt dissection was carried down to the extensor tendon.  There is noted to be significant soft tissue lesions around the tendon to the capsule.  The soft tissue adhesions were removed.  The extensor tendons were reflected back to expose the metatarsal phalangeal joint.  There is noted to be again a multitude of soft tissue adhesions.  These adhesions were removed off of the joint utilizing a McGlamery elevator as well as a 15 blade.  Improved motion about the metatarsophalangeal joint was then noted.  Utilizing a Custora hand  the screw and the metatarsal head was removed.  At this time the hammertoe deformity was reassessed.  It was noted to be sitting in a better position.  There was improved motion about the metatarsophalangeal joint.    The incision was then copiously flushed utilizing sterile saline.  Tendinous structures were reapproximated utilizing 4-0 Vicryl.  Subcuticular closure was achieved utilizing 4-0 Vicryl.  Skin closure was achieved utilizing 4-0 nylon in a horizontal mattress type fashion.  An additional local block consisting of 7 ml of 1% lidocaine and 0.5% marcaine was then injected the left foot.  A postoperative dressing consisting of Xeroform DSD and Ace wrap was applied to the left foot.  The tourniquet was then deflated with immediate hyperemic spots noted to all digits.  Patient tolerated procedure with no complications.     Dr. Ramsey was present for the entire procedure.    Patient Disposition:  PACU              SIGNATURE: Omid Zuniga DPM  DATE: January 21, 2025  TIME: 8:29  AM

## 2025-01-21 NOTE — ANESTHESIA POSTPROCEDURE EVALUATION
Post-Op Assessment Note    CV Status:  Stable  Pain Score: 0    Pain management: adequate       Mental Status:  Awake and alert   Hydration Status:  Stable   PONV Controlled:  None   Airway Patency:  Patent     Post Op Vitals Reviewed: Yes    No anethesia notable event occurred.    Staff: CRNA           Last Filed PACU Vitals:  Vitals Value Taken Time   Temp 98    Pulse 76    /67    Resp 16    SpO2 96 on RA

## 2025-01-23 NOTE — PROGRESS NOTES
Moody Garrido is a(n) 49 y.o. female. , :  1975    Subjective     Assessment:  The primary encounter diagnosis was Recurrent UTI. A diagnosis of Acute cystitis without hematuria was also pertinent to this visit.  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  Ok to increase to 4x per day if having symptoms of a UTI.  Take probiotics daily with food.   Change the brand of the probiotic every 4 months.     Urine from office for UA and C&S  PT to f/u with PCP for her chronic constipation issues  Do not over clean vaginal tissues  Continue with Gyn for her estradiol cream and clobetasol cream          Radiology      Labs   2024 urine culture no growth  2024 urine culture E. Coli  2024 urine culture less than 10,000 colony-forming units  2024 urine culture no growth    Past Medical History  Hypertension  GERD  Spinal stenosis  Dyssynergic defecation (chronic constipation and need for suppository for every BM)   Back surgery  Endometriosis status post laparoscopic fulguration    Past  History   UTI  IC with bladder instillation in her 20s   Dr. Santo SINGLETARY    Past  surgical history         2025 OV [facility]  With hx of UTIs and IC since teenager.   Notes when infected gets urgency, dysuria, odor, and  "increased urinary frequency.   Since her foot surgery in Sep 2024 been having recurrent UTIs that getter better with antibiotics only to returns  a couple weeks later.  No hx any procedures other than prior bladder instillation and cystoscopies.  Last cystoscopy was in her 40s.     Review of Systems    No results found for: \"PSA\"  No results found for: \"TESTOSTERONE\"  No components found for: \"CR\"  No results found for: \"HBA1C\"    Objective     /80 (BP Location: Left arm, Patient Position: Sitting, Cuff Size: Adult)   Pulse 71   Ht 5' 8\" (1.727 m)   Wt 90.3 kg (199 lb)   LMP 08/15/2023 (Approximate) Comment: post menapausal  SpO2 97%   BMI 30.26 kg/m²     Physical Exam  Pulmonary:      Effort: Pulmonary effort is normal.   Abdominal:      General: Abdomen is flat.   Musculoskeletal:      Comments: No CVA tenderness b/l    Skin:     General: Skin is warm.   Neurological:      Mental Status: She is alert.   Psychiatric:         Mood and Affect: Mood normal.           Bogdan Faria [facility]  "

## 2025-01-27 ENCOUNTER — APPOINTMENT (OUTPATIENT)
Dept: RADIOLOGY | Facility: CLINIC | Age: 50
End: 2025-01-27
Payer: COMMERCIAL

## 2025-01-27 ENCOUNTER — OFFICE VISIT (OUTPATIENT)
Age: 50
End: 2025-01-27

## 2025-01-27 VITALS — BODY MASS INDEX: 29.86 KG/M2 | HEIGHT: 68 IN | WEIGHT: 197 LBS

## 2025-01-27 DIAGNOSIS — Z98.890 STATUS POST HARDWARE REMOVAL: ICD-10-CM

## 2025-01-27 DIAGNOSIS — M20.42 HAMMERTOE OF LEFT FOOT: ICD-10-CM

## 2025-01-27 DIAGNOSIS — Z98.890 POST-OPERATIVE STATE: ICD-10-CM

## 2025-01-27 DIAGNOSIS — Z98.890 POST-OPERATIVE STATE: Primary | ICD-10-CM

## 2025-01-27 PROCEDURE — 73630 X-RAY EXAM OF FOOT: CPT

## 2025-01-27 PROCEDURE — 99024 POSTOP FOLLOW-UP VISIT: CPT | Performed by: STUDENT IN AN ORGANIZED HEALTH CARE EDUCATION/TRAINING PROGRAM

## 2025-01-27 NOTE — PROGRESS NOTES
Post-Op Visit    Moody Garrido  1975      Subjective: Patient here for post-op appointment following a repair of the 3rd hammertoe left foot, hardware removal. Patient denies significant pain at the surgical site, active strikethrough drainage, fevers, chills, nightsweats, SOB, chest pain, nor calf pain. The patient is in good spirits.Patient relates compliance with post-op instructions.    Objective: The patient appears in NAD / non-toxic. Primary dressing taken down for wound inspection. VSS. No signs of infection. No active drainage. Normal post-op edema. No necrosis, dehiscence.     Assessment/Plan:  Patient is stable post-op  Discussed compliance with weight bearing instructions, incision care, and rest.   Sutures left in place and intact today.  Incision site was redressed with Xeroform, dry sterile dressing, 4 inch Ace bandage.  This should be left clean, dry, and intact until the patient's next visit  Continue with limited weightbearing to the left foot in a surgical shoe  X-ray from 1/27/2025 interpreted independently: Expected postoperative appearance status post left third hammertoe repair.  Restored third metatarsal length noted secondary to previous Weil osteotomy.  Excellent screw placement and positioning across the third proximal interphalangeal joint.  Return to clinic 1 week    Call if any increase in pain, fevers, calf pain, shortness of breath, or general distress is noted. Patient instructed to go to ER if call is not returned immediately.

## 2025-01-28 ENCOUNTER — CONSULT (OUTPATIENT)
Dept: UROLOGY | Facility: CLINIC | Age: 50
End: 2025-01-28
Payer: COMMERCIAL

## 2025-01-28 VITALS
OXYGEN SATURATION: 97 % | DIASTOLIC BLOOD PRESSURE: 80 MMHG | BODY MASS INDEX: 30.16 KG/M2 | SYSTOLIC BLOOD PRESSURE: 126 MMHG | WEIGHT: 199 LBS | HEIGHT: 68 IN | HEART RATE: 71 BPM

## 2025-01-28 DIAGNOSIS — N39.0 RECURRENT UTI: Primary | ICD-10-CM

## 2025-01-28 DIAGNOSIS — N30.00 ACUTE CYSTITIS WITHOUT HEMATURIA: ICD-10-CM

## 2025-01-28 LAB
BACTERIA UR QL AUTO: ABNORMAL /HPF
BILIRUB UR QL STRIP: NEGATIVE
CLARITY UR: CLEAR
COLOR UR: ABNORMAL
GLUCOSE UR STRIP-MCNC: NEGATIVE MG/DL
HGB UR QL STRIP.AUTO: ABNORMAL
KETONES UR STRIP-MCNC: NEGATIVE MG/DL
LEUKOCYTE ESTERASE UR QL STRIP: ABNORMAL
NITRITE UR QL STRIP: NEGATIVE
NON-SQ EPI CELLS URNS QL MICRO: ABNORMAL /HPF
PH UR STRIP.AUTO: 6.5 [PH]
PROT UR STRIP-MCNC: NEGATIVE MG/DL
RBC #/AREA URNS AUTO: ABNORMAL /HPF
SL AMB  POCT GLUCOSE, UA: NORMAL
SL AMB LEUKOCYTE ESTERASE,UA: NORMAL
SL AMB POCT BILIRUBIN,UA: NORMAL
SL AMB POCT BLOOD,UA: NORMAL
SL AMB POCT CLARITY,UA: NORMAL
SL AMB POCT COLOR,UA: YELLOW
SL AMB POCT KETONES,UA: NORMAL
SL AMB POCT NITRITE,UA: NORMAL
SL AMB POCT PH,UA: 6.5
SL AMB POCT SPECIFIC GRAVITY,UA: 1
SL AMB POCT URINE PROTEIN: NORMAL
SL AMB POCT UROBILINOGEN: 0.2
SP GR UR STRIP.AUTO: 1.01 (ref 1–1.03)
UROBILINOGEN UR STRIP-ACNC: <2 MG/DL
WBC #/AREA URNS AUTO: ABNORMAL /HPF

## 2025-01-28 PROCEDURE — 87186 SC STD MICRODIL/AGAR DIL: CPT | Performed by: PHYSICIAN ASSISTANT

## 2025-01-28 PROCEDURE — 87086 URINE CULTURE/COLONY COUNT: CPT | Performed by: PHYSICIAN ASSISTANT

## 2025-01-28 PROCEDURE — 87077 CULTURE AEROBIC IDENTIFY: CPT | Performed by: PHYSICIAN ASSISTANT

## 2025-01-28 PROCEDURE — 99204 OFFICE O/P NEW MOD 45 MIN: CPT | Performed by: PHYSICIAN ASSISTANT

## 2025-01-28 PROCEDURE — 81002 URINALYSIS NONAUTO W/O SCOPE: CPT | Performed by: PHYSICIAN ASSISTANT

## 2025-01-28 PROCEDURE — 81001 URINALYSIS AUTO W/SCOPE: CPT | Performed by: PHYSICIAN ASSISTANT

## 2025-01-28 RX ORDER — SULFAMETHOXAZOLE AND TRIMETHOPRIM 800; 160 MG/1; MG/1
1 TABLET ORAL EVERY 12 HOURS SCHEDULED
Qty: 10 TABLET | Refills: 0 | Status: SHIPPED | OUTPATIENT
Start: 2025-01-28 | End: 2025-02-02

## 2025-01-28 RX ORDER — SULFAMETHOXAZOLE AND TRIMETHOPRIM 400; 80 MG/1; MG/1
1 TABLET ORAL AS NEEDED
Qty: 90 TABLET | Refills: 3 | Status: SHIPPED | OUTPATIENT
Start: 2025-01-28 | End: 2026-01-28

## 2025-01-30 LAB
BACTERIA UR CULT: ABNORMAL
BACTERIA UR CULT: ABNORMAL

## 2025-02-03 ENCOUNTER — OFFICE VISIT (OUTPATIENT)
Age: 50
End: 2025-02-03

## 2025-02-03 ENCOUNTER — TELEPHONE (OUTPATIENT)
Age: 50
End: 2025-02-03

## 2025-02-03 VITALS — HEIGHT: 68 IN | WEIGHT: 199 LBS | BODY MASS INDEX: 30.16 KG/M2

## 2025-02-03 DIAGNOSIS — N39.0 RECURRENT UTI: Primary | ICD-10-CM

## 2025-02-03 DIAGNOSIS — Z98.890 POST-OPERATIVE STATE: Primary | ICD-10-CM

## 2025-02-03 DIAGNOSIS — Z98.890 STATUS POST HARDWARE REMOVAL: ICD-10-CM

## 2025-02-03 DIAGNOSIS — M20.42 HAMMERTOE OF LEFT FOOT: ICD-10-CM

## 2025-02-03 PROCEDURE — 99024 POSTOP FOLLOW-UP VISIT: CPT | Performed by: STUDENT IN AN ORGANIZED HEALTH CARE EDUCATION/TRAINING PROGRAM

## 2025-02-03 RX ORDER — NITROFURANTOIN 25; 75 MG/1; MG/1
100 CAPSULE ORAL 2 TIMES DAILY
Qty: 14 CAPSULE | Refills: 0 | Status: SHIPPED | OUTPATIENT
Start: 2025-02-03 | End: 2025-02-10

## 2025-02-03 NOTE — TELEPHONE ENCOUNTER
reviewed patient's history and her recent urine culture.  Patient was on Bactrim looks like urine culture susceptibility demonstrated Bactrim resistance.  Okay with sending a alternative antibiotic Macrobid that should be susceptible given report

## 2025-02-03 NOTE — TELEPHONE ENCOUNTER
Patient called in. She was in for consult on 1/28 and was started on Bactrim for UTI. Patient finished course and stating symptoms had only improved slightly but now back completely. Pressure, burning, frequency, urgency. Requesting different ABX. Reviewed Hydration, avoidance of bladder irritants and ED precautions.       [id number] - [place] [phone] [[id number]]

## 2025-02-03 NOTE — TELEPHONE ENCOUNTER
Called and spoke to pt. Made her aware of new abx, advised pt to call the office if she still had symptoms after taking abx.

## 2025-02-03 NOTE — PROGRESS NOTES
Post-Op Visit    Moody Garrido  1975      Subjective: Patient here for post-op appointment following a repair of the 3rd hammertoe left foot, hardware removal. Patient denies significant pain at the surgical site, active strikethrough drainage, fevers, chills, nightsweats, SOB, chest pain, nor calf pain. The patient is in good spirits.Patient relates compliance with post-op instructions.    Objective: The patient appears in NAD / non-toxic. Primary dressing taken down for wound inspection. VSS. No signs of infection. No active drainage. Normal post-op edema. No necrosis, dehiscence.     Assessment/Plan:  Patient is stable post-op  Discussed compliance with weight bearing instructions, incision care, and rest.   Sutures removed today without incident.  Patient's incision site was redressed with Xeroform, dry sterile dressing, 4 inch Ace bandage.  She was instructed to leave this clean, dry, and intact until Friday, 2/7/2025 at which point she may remove the bandaging and wash her foot with soap and water.  She should still refrain from soaking her left foot and refrain from vigorous scrubbing  Continue with weightbearing as tolerated to the left foot   return to clinic 2 weeks    Call if any increase in pain, fevers, calf pain, shortness of breath, or general distress is noted. Patient instructed to go to ER if call is not returned immediately.

## 2025-02-04 ENCOUNTER — RESULTS FOLLOW-UP (OUTPATIENT)
Dept: UROLOGY | Facility: CLINIC | Age: 50
End: 2025-02-04

## 2025-02-17 ENCOUNTER — APPOINTMENT (OUTPATIENT)
Dept: RADIOLOGY | Facility: CLINIC | Age: 50
End: 2025-02-17
Payer: COMMERCIAL

## 2025-02-17 ENCOUNTER — OFFICE VISIT (OUTPATIENT)
Age: 50
End: 2025-02-17

## 2025-02-17 VITALS — HEIGHT: 68 IN | WEIGHT: 199 LBS | BODY MASS INDEX: 30.16 KG/M2

## 2025-02-17 DIAGNOSIS — Z98.890 POST-OPERATIVE STATE: Primary | ICD-10-CM

## 2025-02-17 DIAGNOSIS — Z98.890 STATUS POST HARDWARE REMOVAL: ICD-10-CM

## 2025-02-17 DIAGNOSIS — Z98.890 POST-OPERATIVE STATE: ICD-10-CM

## 2025-02-17 DIAGNOSIS — M20.42 HAMMERTOE OF LEFT FOOT: ICD-10-CM

## 2025-02-17 DIAGNOSIS — M54.16 RADICULOPATHY, LUMBAR REGION: ICD-10-CM

## 2025-02-17 PROCEDURE — 73630 X-RAY EXAM OF FOOT: CPT

## 2025-02-17 PROCEDURE — 99024 POSTOP FOLLOW-UP VISIT: CPT | Performed by: STUDENT IN AN ORGANIZED HEALTH CARE EDUCATION/TRAINING PROGRAM

## 2025-02-17 RX ORDER — GABAPENTIN 300 MG/1
CAPSULE ORAL
Qty: 60 CAPSULE | Refills: 0 | Status: SHIPPED | OUTPATIENT
Start: 2025-02-17

## 2025-02-17 NOTE — PROGRESS NOTES
"[facility] Podiatric Medicine and Surgery Office Visit    ASSESSMENT     Diagnoses and all orders for this visit:    Post-operative state  -     XR foot 3+ vw left; Future    Status post hardware removal  -     XR foot 3+ vw left; Future    Hammertoe of left foot         Problem List Items Addressed This Visit    None  Visit Diagnoses         Post-operative state    -  Primary    Relevant Orders    XR foot 3+ vw left      Status post hardware removal        Relevant Orders    XR foot 3+ vw left      Hammertoe of left foot              PLAN  Patient is stable post-op  Discussed compliance with weight bearing instructions, incision care, and rest.   Continue weightbearing as tolerated to the left foot and supportive sneakers versus surgical   return to clinic 4-weeks    X-ray of the left foot from 2/17/2025 interpreted independently: Maintained alignment and hardware fixation of the left third digit without complication.    SUBJECTIVE    Chief Complaint:  S/p revisional left third digit hammertoe and removal of hardware     Patient ID: Moody Garrido     2/17/2025: Moody is doing overall very well today.  She does still have pain and soreness to the left foot however this is much improved compared to her previous visit.        The following portions of the patient's history were reviewed and updated as appropriate: allergies, current medications, past family history, past medical history, past social history, past surgical history and problem list.    Review of Systems   Constitutional: Negative.    HENT: Negative.     Respiratory: Negative.     Cardiovascular: Negative.    Gastrointestinal: Negative.    Musculoskeletal: Negative.    Skin: Negative.    Neurological: Negative.          OBJECTIVE      Ht 5' 8\" (1.727 m)   Wt 90.3 kg (199 lb)   LMP 08/15/2023 (Approximate) Comment: post menapausal  BMI 30.26 kg/m²        Physical Exam  Constitutional:       Appearance: Normal appearance.   HENT:      Head: " Normocephalic and atraumatic.   Eyes:      General:         Right eye: No discharge.         Left eye: No discharge.   Cardiovascular:      Rate and Rhythm: Normal rate and regular rhythm.      Pulses:           Dorsalis pedis pulses are 2+ on the right side and 2+ on the left side.        Posterior tibial pulses are 2+ on the right side and 2+ on the left side.   Pulmonary:      Effort: Pulmonary effort is normal.      Breath sounds: Normal breath sounds.   Skin:     General: Skin is warm.      Capillary Refill: Capillary refill takes less than 2 seconds.   Neurological:      Sensory: Sensation is intact. No sensory deficit.           Vascular:  -DP and PT pulses intact b/l  -Capillary refill time <2 sec b/l  -Digital hair growth: Present  -Skin temp: WNL    MSK:  -Pain on palpation to left third digit and metatarsophalangeal joint dorsally  -No gross deformities noted   -MMT is 5/5 to all muscle compartments of the lower extremity  -Ankle dorsiflexion >10 degrees with knee extended and knee flexed b/l  -Left third digit dorsiflexion at the level of the metatarsophalangeal joint is approximately 20 degrees, plantarflexion 10 degrees    Neuro:  -Light sensation intact bilaterally  -Protective sensation intact bilaterally    Derm:  -No lesions, abrasions, or open wounds noted  -No noted interdigital maceration, peeling, malodor  -No callus formation noted on exam  -Left third digit incision site remains fully healed today without complication

## 2025-02-28 DIAGNOSIS — M54.16 RADICULOPATHY, LUMBAR REGION: ICD-10-CM

## 2025-02-28 RX ORDER — CYCLOBENZAPRINE HCL 10 MG
10 TABLET ORAL 3 TIMES DAILY PRN
Qty: 90 TABLET | Refills: 0 | Status: SHIPPED | OUTPATIENT
Start: 2025-02-28 | End: 2025-03-30

## 2025-03-02 DIAGNOSIS — I10 PRIMARY HYPERTENSION: ICD-10-CM

## 2025-03-03 RX ORDER — RAMIPRIL 10 MG/1
CAPSULE ORAL
Qty: 90 CAPSULE | Refills: 1 | Status: SHIPPED | OUTPATIENT
Start: 2025-03-03

## 2025-03-17 ENCOUNTER — APPOINTMENT (OUTPATIENT)
Dept: LAB | Facility: CLINIC | Age: 50
End: 2025-03-17
Payer: COMMERCIAL

## 2025-03-17 ENCOUNTER — OFFICE VISIT (OUTPATIENT)
Age: 50
End: 2025-03-17

## 2025-03-17 ENCOUNTER — APPOINTMENT (OUTPATIENT)
Dept: RADIOLOGY | Facility: CLINIC | Age: 50
End: 2025-03-17
Payer: COMMERCIAL

## 2025-03-17 VITALS — BODY MASS INDEX: 30.16 KG/M2 | HEIGHT: 68 IN | WEIGHT: 199 LBS

## 2025-03-17 DIAGNOSIS — M20.42 HAMMERTOE OF LEFT FOOT: ICD-10-CM

## 2025-03-17 DIAGNOSIS — I10 PRIMARY HYPERTENSION: ICD-10-CM

## 2025-03-17 DIAGNOSIS — Z98.890 POST-OPERATIVE STATE: ICD-10-CM

## 2025-03-17 DIAGNOSIS — Z98.890 POST-OPERATIVE STATE: Primary | ICD-10-CM

## 2025-03-17 LAB
ALBUMIN SERPL BCG-MCNC: 4.4 G/DL (ref 3.5–5)
ALP SERPL-CCNC: 53 U/L (ref 34–104)
ALT SERPL W P-5'-P-CCNC: 35 U/L (ref 7–52)
ANION GAP SERPL CALCULATED.3IONS-SCNC: 11 MMOL/L (ref 4–13)
AST SERPL W P-5'-P-CCNC: 41 U/L (ref 13–39)
BILIRUB SERPL-MCNC: 0.72 MG/DL (ref 0.2–1)
BUN SERPL-MCNC: 15 MG/DL (ref 5–25)
CALCIUM SERPL-MCNC: 9.8 MG/DL (ref 8.4–10.2)
CHLORIDE SERPL-SCNC: 100 MMOL/L (ref 96–108)
CHOLEST SERPL-MCNC: 242 MG/DL (ref ?–200)
CO2 SERPL-SCNC: 29 MMOL/L (ref 21–32)
CREAT SERPL-MCNC: 0.61 MG/DL (ref 0.6–1.3)
GFR SERPL CREATININE-BSD FRML MDRD: 105 ML/MIN/1.73SQ M
GLUCOSE P FAST SERPL-MCNC: 95 MG/DL (ref 65–99)
HDLC SERPL-MCNC: 122 MG/DL
LDLC SERPL CALC-MCNC: 109 MG/DL (ref 0–100)
POTASSIUM SERPL-SCNC: 4.5 MMOL/L (ref 3.5–5.3)
PROT SERPL-MCNC: 7.1 G/DL (ref 6.4–8.4)
SODIUM SERPL-SCNC: 140 MMOL/L (ref 135–147)
TRIGL SERPL-MCNC: 54 MG/DL (ref ?–150)

## 2025-03-17 PROCEDURE — 73630 X-RAY EXAM OF FOOT: CPT

## 2025-03-17 PROCEDURE — 99024 POSTOP FOLLOW-UP VISIT: CPT | Performed by: STUDENT IN AN ORGANIZED HEALTH CARE EDUCATION/TRAINING PROGRAM

## 2025-03-17 PROCEDURE — 36415 COLL VENOUS BLD VENIPUNCTURE: CPT

## 2025-03-17 PROCEDURE — 80061 LIPID PANEL: CPT

## 2025-03-17 PROCEDURE — 80053 COMPREHEN METABOLIC PANEL: CPT

## 2025-03-17 NOTE — PROGRESS NOTES
"[facility] Podiatric Medicine and Surgery Office Visit    ASSESSMENT     There are no diagnoses linked to this encounter.       Problem List Items Addressed This Visit    None      PLAN  Patient is stable post-op  Discussed compliance with weight bearing instructions, incision care, and rest.   Continue range of motion exercises to the left 3rd digit  Continue weightbearing as tolerated to the left foot in supportive sneakers   Return to clinic 4-weeks    X-ray of the left foot from 3/17/2025 interpreted independently: Maintained alignment and hardware fixation of the left third digit without complication.    SUBJECTIVE    Chief Complaint:  S/p revisional left third digit hammertoe and removal of hardware     Patient ID: Moody Garrido     2/17/2025: Moody is doing overall very well today.  She does still have pain and soreness to the left foot however this is much improved compared to her previous visit.    3/17/2025: Moody is doing very well today. She states that her left foot has been feeling a lot better since her last visit. She only mentions a slight pain and soreness.       The following portions of the patient's history were reviewed and updated as appropriate: allergies, current medications, past family history, past medical history, past social history, past surgical history and problem list.    Review of Systems   Constitutional: Negative.    HENT: Negative.     Respiratory: Negative.     Cardiovascular: Negative.    Gastrointestinal: Negative.    Musculoskeletal: Negative.    Skin: Negative.    Neurological: Negative.          OBJECTIVE      Ht 5' 8\" (1.727 m)   Wt 90.3 kg (199 lb)   LMP 08/15/2023 (Approximate) Comment: post menapausal  BMI 30.26 kg/m²        Physical Exam  Constitutional:       Appearance: Normal appearance.   HENT:      Head: Normocephalic and atraumatic.   Eyes:      General:         Right eye: No discharge.         Left eye: No discharge.   Cardiovascular:      Rate and " Rhythm: Normal rate and regular rhythm.      Pulses:           Dorsalis pedis pulses are 2+ on the right side and 2+ on the left side.        Posterior tibial pulses are 2+ on the right side and 2+ on the left side.   Pulmonary:      Effort: Pulmonary effort is normal.      Breath sounds: Normal breath sounds.   Skin:     General: Skin is warm.      Capillary Refill: Capillary refill takes less than 2 seconds.   Neurological:      Sensory: Sensation is intact. No sensory deficit.         Vascular:  -DP and PT pulses intact b/l  -Capillary refill time <2 sec b/l  -Digital hair growth: Present  -Skin temp: WNL    MSK:  -Pain on palpation to left third digit and metatarsophalangeal joint dorsally, although this is minimal today  -No gross deformities noted   -MMT is 5/5 to all muscle compartments of the lower extremity  -Ankle dorsiflexion >10 degrees with knee extended and knee flexed b/l  -Left third digit dorsiflexion at the level of the metatarsophalangeal joint is approximately 20 degrees, plantarflexion 10 degrees    Neuro:  -Light sensation intact bilaterally  -Protective sensation intact bilaterally    Derm:  -No lesions, abrasions, or open wounds noted  -No noted interdigital maceration, peeling, malodor  -No callus formation noted on exam  -Left third digit incision site remains fully healed today without complication

## 2025-03-18 DIAGNOSIS — I10 PRIMARY HYPERTENSION: ICD-10-CM

## 2025-03-19 ENCOUNTER — HOSPITAL ENCOUNTER (OUTPATIENT)
Dept: RADIOLOGY | Facility: HOSPITAL | Age: 50
Discharge: HOME | End: 2025-03-19
Payer: COMMERCIAL

## 2025-03-19 DIAGNOSIS — N39.0 RECURRENT UTI: ICD-10-CM

## 2025-03-19 PROCEDURE — 76770 US EXAM ABDO BACK WALL COMP: CPT

## 2025-03-19 RX ORDER — METOPROLOL SUCCINATE 25 MG/1
25 TABLET, EXTENDED RELEASE ORAL DAILY
Qty: 90 TABLET | Refills: 1 | Status: SHIPPED | OUTPATIENT
Start: 2025-03-19

## 2025-03-27 ENCOUNTER — OFFICE VISIT (OUTPATIENT)
Dept: FAMILY MEDICINE CLINIC | Facility: CLINIC | Age: 50
End: 2025-03-27
Payer: COMMERCIAL

## 2025-03-27 VITALS
BODY MASS INDEX: 30.31 KG/M2 | WEIGHT: 200 LBS | HEART RATE: 90 BPM | HEIGHT: 68 IN | TEMPERATURE: 97.7 F | RESPIRATION RATE: 18 BRPM | OXYGEN SATURATION: 98 % | DIASTOLIC BLOOD PRESSURE: 80 MMHG | SYSTOLIC BLOOD PRESSURE: 130 MMHG

## 2025-03-27 DIAGNOSIS — I10 PRIMARY HYPERTENSION: ICD-10-CM

## 2025-03-27 DIAGNOSIS — N30.00 ACUTE CYSTITIS WITHOUT HEMATURIA: Primary | ICD-10-CM

## 2025-03-27 DIAGNOSIS — K92.1 HEMATOCHEZIA: ICD-10-CM

## 2025-03-27 DIAGNOSIS — K59.02 DYSSYNERGIC DEFECATION: ICD-10-CM

## 2025-03-27 LAB
SL AMB  POCT GLUCOSE, UA: 0
SL AMB LEUKOCYTE ESTERASE,UA: 500
SL AMB POCT BILIRUBIN,UA: 1
SL AMB POCT BLOOD,UA: 50
SL AMB POCT CLARITY,UA: ABNORMAL
SL AMB POCT COLOR,UA: ABNORMAL
SL AMB POCT KETONES,UA: 0
SL AMB POCT NITRITE,UA: POSITIVE
SL AMB POCT PH,UA: 5
SL AMB POCT SPECIFIC GRAVITY,UA: 1.01
SL AMB POCT URINE PROTEIN: 30
SL AMB POCT UROBILINOGEN: 4

## 2025-03-27 PROCEDURE — 87086 URINE CULTURE/COLONY COUNT: CPT | Performed by: STUDENT IN AN ORGANIZED HEALTH CARE EDUCATION/TRAINING PROGRAM

## 2025-03-27 PROCEDURE — 87186 SC STD MICRODIL/AGAR DIL: CPT | Performed by: STUDENT IN AN ORGANIZED HEALTH CARE EDUCATION/TRAINING PROGRAM

## 2025-03-27 PROCEDURE — 81003 URINALYSIS AUTO W/O SCOPE: CPT | Performed by: STUDENT IN AN ORGANIZED HEALTH CARE EDUCATION/TRAINING PROGRAM

## 2025-03-27 PROCEDURE — 87077 CULTURE AEROBIC IDENTIFY: CPT | Performed by: STUDENT IN AN ORGANIZED HEALTH CARE EDUCATION/TRAINING PROGRAM

## 2025-03-27 PROCEDURE — 99214 OFFICE O/P EST MOD 30 MIN: CPT | Performed by: STUDENT IN AN ORGANIZED HEALTH CARE EDUCATION/TRAINING PROGRAM

## 2025-03-27 RX ORDER — NITROFURANTOIN 25; 75 MG/1; MG/1
100 CAPSULE ORAL 2 TIMES DAILY
Qty: 10 CAPSULE | Refills: 0 | Status: SHIPPED | OUTPATIENT
Start: 2025-03-27 | End: 2025-04-01

## 2025-03-27 NOTE — ASSESSMENT & PLAN NOTE
-/80  -Continue Toprol-XL 25 mg daily and ramipril 10 mg daily  Orders:  •  Comprehensive metabolic panel; Future  •  Lipid Panel with Direct LDL reflex; Future

## 2025-03-27 NOTE — PROGRESS NOTES
Name: Moody Garrido      : 1975      MRN: [id number]  Encounter Provider: Katarina Gonsalez MD  Encounter Date: 3/27/2025   Encounter department: [facility]  :  Assessment & Plan  Acute cystitis without hematuria  -Follows with urology  Orders:  •  nitrofurantoin (MACROBID) 100 mg capsule; Take 1 capsule (100 mg total) by mouth 2 (two) times a day for 5 days  •  POCT urine dip auto non-scope  •  Urine culture    Hematochezia    Orders:  •  Ambulatory Referral to Gastroenterology; Future    Dyssynergic defecation    Orders:  •  Ambulatory Referral to Gastroenterology; Future    Primary hypertension  -/80  -Continue Toprol-XL 25 mg daily and ramipril 10 mg daily  Orders:  •  Comprehensive metabolic panel; Future  •  Lipid Panel with Direct LDL reflex; Future          Depression Screening and Follow-up Plan: Patient's depression screening was positive with a PHQ-2 score of 6. Their PHQ-9 score was 18.   Patient declines further evaluation by mental health professional and/or medications. Brief counseling provided. Will re-evaluate at next office visit.       History of Present Illness   HPI    Presents for blood pressure check and lipid check.  Notes that she has dyssynergic defecation and persistent bloody stools.  She needs to use a suppository to have a bowel movement.  She has not had a colonoscopy yet.  She has chronic cystitis.  She reports urinary frequency, urgency and pelvic discomfort.  Denies fever and chills.  She notes urinary symptoms started this morning.  She denies blood in the urine.  She follows with urology.     Review of Systems   Constitutional:  Negative for activity change, chills, diaphoresis, fatigue and fever.   HENT:  Negative for congestion, postnasal drip, rhinorrhea and sore throat.    Respiratory:  Negative for cough, shortness of breath and wheezing.    Cardiovascular:  Negative for chest pain, palpitations and leg swelling.   Gastrointestinal:   "Negative for abdominal pain, constipation, diarrhea, nausea and vomiting.   Genitourinary:  Positive for frequency, pelvic pain and urgency. Negative for hematuria.   Musculoskeletal:  Negative for myalgias.   Skin:  Negative for rash.   Neurological:  Negative for weakness, light-headedness and headaches.   Psychiatric/Behavioral:  The patient is not nervous/anxious.        Objective   /80   Pulse 90   Temp 97.7 °F (36.5 °C)   Resp 18   Ht 5' 8\" (1.727 m)   Wt 90.7 kg (200 lb)   LMP 08/15/2023 (Approximate) Comment: post menapausal  SpO2 98%   BMI 30.41 kg/m²      Physical Exam  Constitutional:       Appearance: Normal appearance.   HENT:      Head: Normocephalic and atraumatic.   Cardiovascular:      Rate and Rhythm: Normal rate and regular rhythm.      Pulses: Normal pulses.      Heart sounds: Normal heart sounds.   Pulmonary:      Effort: Pulmonary effort is normal.      Breath sounds: Normal breath sounds.   Neurological:      General: No focal deficit present.      Mental Status: She is alert and oriented to person, place, and time.   Psychiatric:         Mood and Affect: Mood normal.         Behavior: Behavior normal.         Thought Content: Thought content normal.         Judgment: Judgment normal.         "

## 2025-03-28 ENCOUNTER — TELEPHONE (OUTPATIENT)
Dept: ADMINISTRATIVE | Facility: OTHER | Age: 50
End: 2025-03-28

## 2025-03-28 NOTE — TELEPHONE ENCOUNTER
Upon review of the In Basket request and the patient's chart, initial outreach has been made via fax to facility. Please see Contacts section for details.     Thank you  Abigail Nur MA

## 2025-03-28 NOTE — LETTER
Procedure Request Form: Cervical Cancer Screening      Date Requested: 25  Patient: Moody Garrido  Patient : 1975   Referring Provider: Katarina Gonsalez MD        Date of Procedure ______________________________       The above patient has informed us that they have completed their   most recent Cervical Cancer Screening at your facility. Please complete   this form and attach all corresponding procedure reports/results.    Comments ____Most Recent  ______________________________________________________  ____________________________________________________________________  ____________________________________________________________________  ____________________________________________________________________    Facility Completing Procedure _________________________________________    Form Completed By (print name) _______________________________________      Signature __________________________________________________________      These reports are needed for  compliance.    Please fax this completed form and a copy of the procedure report to the [facility] Based Department as soon as possible via Fax [phone], yeni Hayes: Phone [phone]. Our office is located at [address] [place] [address].    We thank you for your assistance in treating our mutual patient.

## 2025-03-28 NOTE — LETTER
Procedure Request Form: Cervical Cancer Screening      Date Requested: 25  Patient: Moody Garrido  Patient : 1975   Referring Provider: Katarina Gonsalez MD        Date of Procedure ______________________________       The above patient has informed us that they have completed their   most recent Cervical Cancer Screening at your facility. Please complete   this form and attach all corresponding procedure reports/results.    Comments __Most recent  ________________________________________________________  ____________________________________________________________________  ____________________________________________________________________  ____________________________________________________________________    Facility Completing Procedure _________________________________________    Form Completed By (print name) _______________________________________      Signature __________________________________________________________      These reports are needed for  compliance.    Please fax this completed form and a copy of the procedure report to the [facility] Based Department as soon as possible via Fax [phone], yeni Hayes: Phone [phone]. Our office is located at [address] [place] [address].    We thank you for your assistance in treating our mutual patient.

## 2025-03-28 NOTE — TELEPHONE ENCOUNTER
----- Message from Sofia GONZALES sent at 3/27/2025 12:06 PM EDT -----  Regarding: Care Gap Requst  03/27/25 12:06 PM    Hello, our patient attached above has had Pap Smear (HPV) aka Cervical Cancer Screening completed/performed. Please assist in updating the patient chart by making an External outreach to [facility] located in [facility]. [phone]    Thank you,  Sofia Che  [facility]

## 2025-03-29 LAB — BACTERIA UR CULT: ABNORMAL

## 2025-03-31 ENCOUNTER — TELEPHONE (OUTPATIENT)
Age: 50
End: 2025-03-31

## 2025-04-01 DIAGNOSIS — N30.00 ACUTE CYSTITIS WITHOUT HEMATURIA: Primary | ICD-10-CM

## 2025-04-01 NOTE — TELEPHONE ENCOUNTER
Reviewed Urine cx-  patient is resistant to many medications. Sent in augmentin because patient is susceptible to this medication.

## 2025-04-04 NOTE — TELEPHONE ENCOUNTER
As a follow-up, a second attempt has been made for outreach via fax to facility. Please see Contacts section for details.    Thank you  Abigail Nur MA

## 2025-04-09 NOTE — TELEPHONE ENCOUNTER
As a final attempt, a third outreach has been made via telephone call to facility. Please see Contacts section for details. This encounter will be closed and completed by end of day. Should we receive the requested information because of previous outreach attempts, the requested patient's chart will be updated appropriately.   Left message for the office to fax over Pap Smear results.   Thank you  Abigail Nur MA

## 2025-04-11 ENCOUNTER — TELEPHONE (OUTPATIENT)
Age: 50
End: 2025-04-11

## 2025-04-11 NOTE — TELEPHONE ENCOUNTER
Patient is on Bactrim for a UTI.  She has developed a yeast infection.    She is asking if Diflucan can be called into her pharmacy.    Please advise.     Preferred Pharmacy:   [id number] [place] [id number]

## 2025-04-14 ENCOUNTER — OFFICE VISIT (OUTPATIENT)
Age: 50
End: 2025-04-14

## 2025-04-14 ENCOUNTER — APPOINTMENT (OUTPATIENT)
Dept: RADIOLOGY | Facility: CLINIC | Age: 50
End: 2025-04-14
Attending: STUDENT IN AN ORGANIZED HEALTH CARE EDUCATION/TRAINING PROGRAM
Payer: COMMERCIAL

## 2025-04-14 VITALS — HEIGHT: 68 IN | BODY MASS INDEX: 30.31 KG/M2 | WEIGHT: 200 LBS

## 2025-04-14 DIAGNOSIS — Z98.890 POST-OPERATIVE STATE: Primary | ICD-10-CM

## 2025-04-14 DIAGNOSIS — Z98.890 POST-OPERATIVE STATE: ICD-10-CM

## 2025-04-14 DIAGNOSIS — M20.42 HAMMERTOE OF LEFT FOOT: ICD-10-CM

## 2025-04-14 DIAGNOSIS — B37.31 VAGINAL CANDIDIASIS: Primary | ICD-10-CM

## 2025-04-14 PROCEDURE — 73630 X-RAY EXAM OF FOOT: CPT

## 2025-04-14 PROCEDURE — 99024 POSTOP FOLLOW-UP VISIT: CPT | Performed by: STUDENT IN AN ORGANIZED HEALTH CARE EDUCATION/TRAINING PROGRAM

## 2025-04-14 RX ORDER — FLUCONAZOLE 150 MG/1
150 TABLET ORAL ONCE
Qty: 1 TABLET | Refills: 0 | Status: SHIPPED | OUTPATIENT
Start: 2025-04-14 | End: 2025-04-14

## 2025-04-14 NOTE — PROGRESS NOTES
"[facility] Podiatric Medicine and Surgery Office Visit    ASSESSMENT     Diagnoses and all orders for this visit:    Post-operative state  -     XR foot 3+ vw left; Future    Hammertoe of left foot  -     XR foot 3+ vw left; Future           Problem List Items Addressed This Visit    None  Visit Diagnoses         Post-operative state    -  Primary    Relevant Orders    XR foot 3+ vw left      Hammertoe of left foot        Relevant Orders    XR foot 3+ vw left            PLAN  Patient is stable post-op  Continue range of motion exercises to the left 3rd digit  Continue weightbearing as tolerated to the left foot in supportive sneakers, no limitation on activity at this time  Return to clinic as needed for new or worsening podiatric concerns    X-ray of the left foot from 4/14/2025 interpreted independently: Maintained alignment and hardware fixation of the left third digit without complication.    SUBJECTIVE    Chief Complaint:  S/p revisional left third digit hammertoe and removal of hardware     Patient ID: Moody Garrido     4/14/2025: Moody presents today for follow-up regarding her left third toe.  She states that she is overall doing well and although it does get sore sometimes, it is feeling much better than her previous visit.  She states that she is still been performing range of motion exercises at home        The following portions of the patient's history were reviewed and updated as appropriate: allergies, current medications, past family history, past medical history, past social history, past surgical history and problem list.    Review of Systems   Constitutional: Negative.    HENT: Negative.     Respiratory: Negative.     Cardiovascular: Negative.    Gastrointestinal: Negative.    Musculoskeletal: Negative.    Skin: Negative.    Neurological: Negative.          OBJECTIVE      Ht 5' 8\" (1.727 m)   Wt 90.7 kg (200 lb)   LMP 08/15/2023 (Approximate) Comment: post menapausal  BMI 30.41 kg/m²        " Physical Exam  Constitutional:       Appearance: Normal appearance.   HENT:      Head: Normocephalic and atraumatic.   Eyes:      General:         Right eye: No discharge.         Left eye: No discharge.   Cardiovascular:      Rate and Rhythm: Normal rate and regular rhythm.      Pulses:           Dorsalis pedis pulses are 2+ on the right side and 2+ on the left side.        Posterior tibial pulses are 2+ on the right side and 2+ on the left side.   Pulmonary:      Effort: Pulmonary effort is normal.      Breath sounds: Normal breath sounds.   Skin:     General: Skin is warm.      Capillary Refill: Capillary refill takes less than 2 seconds.   Neurological:      Sensory: Sensation is intact. No sensory deficit.           Vascular:  -DP and PT pulses intact b/l  -Capillary refill time <2 sec b/l  -Digital hair growth: Present  -Skin temp: WNL    MSK:  -No pain on palpation noted today  -No gross deformities noted   -MMT is 5/5 to all muscle compartments of the lower extremity  -Ankle dorsiflexion >10 degrees with knee extended and knee flexed b/l  -Left third digit dorsiflexion at the level of the metatarsophalangeal joint is approximately 20 degrees, plantarflexion 10 degrees  - Adequate correction of left third digit noted on standing examination.  Toe is slightly elevated from the ground with weightbearing.    Neuro:  -Light sensation intact bilaterally  -Protective sensation intact bilaterally    Derm:  -No lesions, abrasions, or open wounds noted  -No noted interdigital maceration, peeling, malodor  -No callus formation noted on exam  -Left third digit incision site remains fully healed today without complication

## 2025-04-14 NOTE — TELEPHONE ENCOUNTER
Patient called in to follow up on medication, advised provider was out on Friday but would follow up.    Please advise, thank you

## 2025-04-15 NOTE — PROGRESS NOTES
Moody Garrido is a(n) 49 y.o. female. , :  1975    Subjective     Assessment:  The primary encounter diagnosis was Recurrent UTI. A diagnosis of Acute cystitis without hematuria was also pertinent to this visit.  Recurrent UTI  Recurrent UTIs since teenager  Increased frequency of UTI since foot surgery Sep 2024  Dyssynergic defecation (chronic constipation and need for suppository for every BM)   Currently sexually active   Perimenopausal   Bactrim single strength at time of intercourse  (Last culture resistant to bactrim)   2025 urine culture E. Coli Macrobid 100 mg twice daily x 5 days chagne to Augmentin BID x 10 days      IC  IC hx with prior bladder instillations in her 20s   Chronic suprapubic pain      Vaginal atrophy  Vaginal atrophy using estradiol cream weekly from GYN     Lichen sclerosis   Lichen sclerosis and using clobetasol cream BIW from GYN       Plan:  F/u cystoscopy to evaluate for any cause of recurrent UTI and suprapubic pain  UA from office for micro and C&S  Stop using bactrim at time of intercourse and try nitrofurantion 50mg.  SED: cough and pulmonary fibrosis.   OK to try prelief to see if helps with suprapubic pain  If urine culture negative will prescribe atarax to possibly help with suprapubic pain.  SED: drowsiness          Radiology   25 Renal US   No hydro, no stones, PVR 5mL.      Labs   2024 urine culture no growth  2024 urine culture E. Coli  2024 urine culture less than 10,000 colony-forming units  2024 urine culture no growth  2025 urine culture E. Coli Bactrim DS p.o. twice daily x 5 days changed to Macrobid 100 mg twice daily x 7 days  2025 urine culture E. Coli Macrobid 100 mg twice daily x 5 days chagne to Augmentin BID x 10 days      Past Medical History  Hypertension  GERD  Spinal stenosis  Dyssynergic defecation (chronic constipation and need for suppository for every BM)   Back surgery  Endometriosis status post  laparoscopic fulguration     Past  History   UTI  IC with bladder instillation in her 20s   Dr. Santo SINGLETARY     Past  surgical history       Prior Visits  1/28/2025 OV Bogdan Faria  With hx of UTIs and IC since teenager.   Notes when infected gets urgency, dysuria, odor, and increased urinary frequency.   Since her foot surgery in Sep 2024 been having recurrent UTIs that getter better with antibiotics only to returns  a couple weeks later.  No hx any procedures other than prior bladder instillation and cystoscopies.  Last cystoscopy was in her 40s.    Assessment:  Recurrent UTI  Recurrent UTIs since teenager  Increased frequency of UTI since foot surgery Sep 2024  Dyssynergic defecation (chronic constipation and need for suppository for every BM)   Currently sexually active   Perimenopausal      IC  IC hx with prior bladder instillations in her 20s      Vaginal atrophy  Vaginal atrophy using estradiol cream weekly from GYN     Lichen sclerosis   Lichen sclerosis and using clobetasol cream BIW from GYN      Plan:  F/u 3-4 months with renal US   Bactrim DS po BID x 5 days  Bactrim SS po at time of intercourse  If has continued UTI despite bactrim PRN and D-mannose may need 6 months of suppressive antibiotics   Take D-mannose 1gm (1000mg) in a 8-12 oz glass of water in the AM and PM.  Ok to increase to 4x per day if having symptoms of a UTI.  Take probiotics daily with food.   Change the brand of the probiotic every 4 months.      Urine from office for UA and C&S  PT to f/u with PCP for her chronic constipation issues  Do not over clean vaginal tissues  Continue with Gyn for her estradiol cream and clobetasol cream     4/17/2025 OV Bogdan Faria  49-year-old female with prior history of IC bladder instillation 0 20, vaginal atrophy on estradiol from GYN, lichen sclerosis on clobetasol cream twice a week from her GYN, recurrent UTIs since teenager.  She also is with dyssynergic defecation and chronic  "constipation needed for suppository to incite a bowel movement.  Was placed on Bactrim single strength at time of intercourse as well as d-mannose and probiotics.  Currently presents for follow-up  Most recently tx with Macrobid for an E coli UTI 03/27/25 that was then changed to Augmentin BID x 10 days .   Been taking the azo for months due to suprapubic pain that is worse with voiding.  Not sure if IC flare for UTI.  No urethral or vaginal pain with voiding.     Review of Systems    No results found for: \"PSA\"  No results found for: \"TESTOSTERONE\"  No components found for: \"CR\"  No results found for: \"HBA1C\"    Objective     /76 (BP Location: Left arm, Patient Position: Sitting, Cuff Size: Standard)   Pulse 86   Ht 5' 8\" (1.727 m)   Wt 86.6 kg (191 lb)   LMP 08/15/2023 (Approximate) Comment: post menapausal  SpO2 97%   BMI 29.04 kg/m²     Physical Exam      Bogdan Faria, [facility]  "

## 2025-04-17 ENCOUNTER — OFFICE VISIT (OUTPATIENT)
Dept: UROLOGY | Facility: CLINIC | Age: 50
End: 2025-04-17
Payer: COMMERCIAL

## 2025-04-17 VITALS
DIASTOLIC BLOOD PRESSURE: 76 MMHG | BODY MASS INDEX: 28.95 KG/M2 | WEIGHT: 191 LBS | HEIGHT: 68 IN | SYSTOLIC BLOOD PRESSURE: 126 MMHG | OXYGEN SATURATION: 97 % | HEART RATE: 86 BPM

## 2025-04-17 DIAGNOSIS — N39.0 RECURRENT UTI: Primary | ICD-10-CM

## 2025-04-17 DIAGNOSIS — N30.00 ACUTE CYSTITIS WITHOUT HEMATURIA: ICD-10-CM

## 2025-04-17 LAB
BACTERIA UR QL AUTO: ABNORMAL /HPF
BILIRUB UR QL STRIP: ABNORMAL
CLARITY UR: CLEAR
COLOR UR: ABNORMAL
GLUCOSE UR STRIP-MCNC: NEGATIVE MG/DL
HGB UR QL STRIP.AUTO: NEGATIVE
KETONES UR STRIP-MCNC: NEGATIVE MG/DL
LEUKOCYTE ESTERASE UR QL STRIP: NEGATIVE
NITRITE UR QL STRIP: POSITIVE
NON-SQ EPI CELLS URNS QL MICRO: ABNORMAL /HPF
PH UR STRIP.AUTO: 6.5 [PH]
PROT UR STRIP-MCNC: NEGATIVE MG/DL
RBC #/AREA URNS AUTO: ABNORMAL /HPF
SL AMB  POCT GLUCOSE, UA: NORMAL
SL AMB LEUKOCYTE ESTERASE,UA: NORMAL
SL AMB POCT BILIRUBIN,UA: NORMAL
SL AMB POCT BLOOD,UA: NORMAL
SL AMB POCT CLARITY,UA: CLEAR
SL AMB POCT COLOR,UA: NORMAL
SL AMB POCT KETONES,UA: NORMAL
SL AMB POCT NITRITE,UA: NORMAL
SL AMB POCT PH,UA: 6.5
SL AMB POCT SPECIFIC GRAVITY,UA: 1
SL AMB POCT URINE PROTEIN: NORMAL
SL AMB POCT UROBILINOGEN: 0.2
SP GR UR STRIP.AUTO: 1.01 (ref 1–1.03)
UROBILINOGEN UR STRIP-ACNC: 3 MG/DL
WBC #/AREA URNS AUTO: ABNORMAL /HPF

## 2025-04-17 PROCEDURE — 81001 URINALYSIS AUTO W/SCOPE: CPT | Performed by: PHYSICIAN ASSISTANT

## 2025-04-17 PROCEDURE — 99213 OFFICE O/P EST LOW 20 MIN: CPT | Performed by: PHYSICIAN ASSISTANT

## 2025-04-17 PROCEDURE — 87086 URINE CULTURE/COLONY COUNT: CPT | Performed by: PHYSICIAN ASSISTANT

## 2025-04-17 PROCEDURE — 81002 URINALYSIS NONAUTO W/O SCOPE: CPT | Performed by: PHYSICIAN ASSISTANT

## 2025-04-17 RX ORDER — NITROFURANTOIN MACROCRYSTALS 50 MG/1
50 CAPSULE ORAL AS NEEDED
Qty: 30 CAPSULE | Refills: 3 | Status: SHIPPED | OUTPATIENT
Start: 2025-04-17

## 2025-04-17 RX ORDER — FLUCONAZOLE 150 MG/1
TABLET ORAL
COMMUNITY
Start: 2025-04-14

## 2025-04-18 LAB — BACTERIA UR CULT: NORMAL

## 2025-04-29 ENCOUNTER — DOCUMENTATION (OUTPATIENT)
Dept: GENETICS | Facility: CLINIC | Age: 50
End: 2025-04-29

## 2025-05-01 ENCOUNTER — OFFICE VISIT (OUTPATIENT)
Dept: GENETICS | Facility: CLINIC | Age: 50
End: 2025-05-01

## 2025-05-01 DIAGNOSIS — Z71.83 ENCOUNTER FOR NONPROCREATIVE GENETIC COUNSELING: ICD-10-CM

## 2025-05-01 DIAGNOSIS — Z91.89 AT HIGH RISK FOR BREAST CANCER: ICD-10-CM

## 2025-05-01 DIAGNOSIS — Z80.0 FAMILY HISTORY OF PANCREATIC CANCER: ICD-10-CM

## 2025-05-01 DIAGNOSIS — Z80.3 FAMILY HISTORY OF BREAST CANCER: Primary | ICD-10-CM

## 2025-05-01 DIAGNOSIS — Z80.0 FAMILY HISTORY OF COLON CANCER: ICD-10-CM

## 2025-05-01 PROCEDURE — 99999 PR OFFICE/OUTPT VISIT,PROCEDURE ONLY: CPT | Performed by: GENETIC COUNSELOR, MS

## 2025-05-01 NOTE — PROGRESS NOTES
Pre-Test Genetic Counseling Consult Note    Patient Name: Moody Garrido   /Age: 1975/49 y.o.  Referring Provider: Katarina Gonsalez MD     Date of Service: 2025  Genetic Counselor: Tere Acosta MS, [facility] [place]  Interpretation Services: None  Location: Telephone consult   Length of Visit: 30 Minutes    Moody was referred to the [facility] Genetic [facility] due to her family history of breast cancer . she presents today to discuss the possibility of a hereditary cancer syndrome, options for genetic testing, and implications for her and her family.     Cancer History and Treatment:   Personal History:   Oncology History    No history exists.     Screening Hx:   Breast:  Breast Imaging: mammo done recently    Colon:  Colonoscopy: 3/29/22  no polyps reported    Gynecologic:  Ovaries and Uterus intact     Skin:  No current screening    Other screening: none    Reproductive History  Age at menarche: 9  Age at first live birth: Nulliparous  Menopause: Post Menopausal (48)  Hormone replacement: none    Medical and Surgical History  Pertinent surgical history:   Past Surgical History:   Procedure Laterality Date    ABDOMINAL SURGERY      second surgery for endometriosis    BACK SURGERY      CHOLECYSTECTOMY      LAPAROSCOPIC ENDOMETRIOSIS FULGURATION      CT CORRECTION HAMMERTOE Left 2024    Procedure: REPAIR HAMMERTOE third digit;  Surgeon: Lalo Ramsey DPM;  Location: [facility] OR;  Service: Podiatry    CT OSTEOT W/WO LNGTH SHRT/CORRJ METAR XCP 1ST EA Left 2024    Procedure: Weil osteotomy third metatarsal;  Surgeon: Lalo Ramsey DPM;  Location: [facility] OR;  Service: Podiatry    TOE OSTEOTOMY Left 2025    Procedure: Repair 3rd hammertoe left foot, hardware removal;  Surgeon: Lalo Ramsey DPM;  Location: [facility] OR;  Service: Podiatry      Pertinent medical history:  Past Medical History:   Diagnosis Date    GERD (gastroesophageal reflux disease)     History of stomach  "ulcers     Hx of fracture of skull     Hypertension     IC (interstitial cystitis)     Hx infusions x10 in 2016    Neck fracture (HCC)     PONV (postoperative nausea and vomiting)     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       Please refer to the scanned pedigree in the Media Tab for a complete family history     *All history is reported as provided by the patient; records are not available for review, except where indicated.     Assessment:  We discussed sporadic, familial and hereditary cancer.  We also discussed the many factors that influence our risk for cancer such as age, environmental exposures, lifestyle choices and family history.      We reviewed the indications suggestive of a hereditary predisposition to cancer.    Of note, While testing an affected family member is most informative, it is also appropriate to test unaffected family members who meet testing criteria (NCCN Guidelines for Genetic/Familial High-Risk Assessment: Breast, Ovarian, and Pancreatic).      Genetic testing is indicated for Moody based on the following criteria: Meets NCCN V3.2025 Testing Criteria for High-Penetrance Breast Cancer Susceptibility Genes: family history of 3 or more second-degree relatives diagnosed with breast cancer    The risks, benefits, and limitations of genetic testing were reviewed with the patient, as well as genetic discrimination laws, and possible test results (positive, negative, variants of uncertain significance) and their clinical implications. If positive for a mutation, options for managing cancer risk including increased surveillance, chemoprevention, and in some cases prophylactic surgery were " discussed. Moody was informed that if a hereditary cancer syndrome was identified in her, first degree relatives (parents, siblings, and children) have a chance of also inheriting the condition. Genetic testing would allow for predictive genetic testing in other relatives, who may also be at risk depending on their degree of relation.     Billing:  Most individuals pay <$100 for hereditary cancer genetic testing. If insurance covers the cost of the testing, individuals may still pay out of pocket secondary to co-pays, co-insurance, or deductibles. If the cost of the testing exceeds $100, the lab will reach out to the patient via phone or e-mail. The patient will then have the option to proceed with the testing, cancel the testing, or elect the self-pay option of $250. Moody verbalized understanding.     Plan: Patient decided to proceed with testing and provided consent.    Summary:     Sample Collection:  An order was placed and the patient was instructed to go to a [facility] lab for a blood collection    Genetic Testing Performed: CustomNext: Cancer® +RNAinsight® (59 genes): APC, ODILIA, AXIN2, BAP1, BARD1, BMPR1A, BRCA1, BRCA2, BRIP1, CDH1, CDK4, CDKN1B, CDKN2A, CHEK2, CTNNA1, DICER1, EGLN1, EPCAM, FH, FLCN, GREM1, HOXB13, KIF1B, KIT, MAX, MEN1, MET, MITF, MLH1, MSH2, MSH3, MSH6, MUTYH, NF1, NTHL1, PALB2, PDGFRA PMS2, POLD1, POLE, POT1, PTEN, RAD51C, RAD51D, RB1, RET, SDHA, SDHAF2, SDHB, SDHC, SDHD, SMAD4, SMARCA4, STK11, DAAI111, TP53, TSC1, TSC2, VHL     Result Call Information:  Moody will be notified via Na[facility] once results are available. I will also call her to discuss results:   I confirmed the patient's mobile number on file as the best number to call with results  I confirmed with the patient that we can leave a voicemail on the provided numbers    Results take approximately 2-3 weeks to complete once test is started.    Additional recommendations for surveillance/medical management will be made pending  genetic test results.

## 2025-05-02 ENCOUNTER — TELEPHONE (OUTPATIENT)
Dept: HEMATOLOGY ONCOLOGY | Facility: CLINIC | Age: 50
End: 2025-05-02

## 2025-05-02 NOTE — TELEPHONE ENCOUNTER
Referral to Surgical Oncology received.  Chart reviewed by  for Surgical oncology at this time.       Diagnosis:  Z80.3 (ICD-10-CM) - Family history of breast cancer  Z91.89 (ICD-10-CM) - At high risk for breast cancer       After review of chart, instructions for scheduling added to referral and sent to be scheduled as advised.

## 2025-05-20 DIAGNOSIS — M54.16 RADICULOPATHY, LUMBAR REGION: ICD-10-CM

## 2025-05-20 RX ORDER — CYCLOBENZAPRINE HCL 10 MG
10 TABLET ORAL 3 TIMES DAILY PRN
Qty: 90 TABLET | Refills: 0 | Status: SHIPPED | OUTPATIENT
Start: 2025-05-20 | End: 2025-06-19

## 2025-05-20 NOTE — TELEPHONE ENCOUNTER
LOV 11/11/24. Was to complete PT and f/u in 6 weeks to order CT scan.  Pt has not follow-up   Last Rx for Flexeril provided 2/28/25  Pt notes she takes it 2-3 times daily  Please advise on refill

## 2025-05-21 ENCOUNTER — TELEPHONE (OUTPATIENT)
Dept: UROLOGY | Facility: CLINIC | Age: 50
End: 2025-05-21

## 2025-05-21 ENCOUNTER — OFFICE VISIT (OUTPATIENT)
Age: 50
End: 2025-05-21
Attending: STUDENT IN AN ORGANIZED HEALTH CARE EDUCATION/TRAINING PROGRAM
Payer: COMMERCIAL

## 2025-05-21 VITALS
HEART RATE: 78 BPM | BODY MASS INDEX: 29.86 KG/M2 | DIASTOLIC BLOOD PRESSURE: 92 MMHG | SYSTOLIC BLOOD PRESSURE: 139 MMHG | HEIGHT: 68 IN | WEIGHT: 197 LBS

## 2025-05-21 DIAGNOSIS — Z87.11 HISTORY OF PEPTIC ULCER: ICD-10-CM

## 2025-05-21 DIAGNOSIS — R74.01 ELEVATED TRANSAMINASE LEVEL: Primary | ICD-10-CM

## 2025-05-21 DIAGNOSIS — K62.5 RECTAL BLEEDING: ICD-10-CM

## 2025-05-21 DIAGNOSIS — Z86.19 HISTORY OF HELICOBACTER PYLORI INFECTION: ICD-10-CM

## 2025-05-21 DIAGNOSIS — K59.09 CHRONIC CONSTIPATION: ICD-10-CM

## 2025-05-21 DIAGNOSIS — N39.0 RECURRENT UTI: Primary | ICD-10-CM

## 2025-05-21 DIAGNOSIS — K21.9 GASTROESOPHAGEAL REFLUX DISEASE, UNSPECIFIED WHETHER ESOPHAGITIS PRESENT: ICD-10-CM

## 2025-05-21 DIAGNOSIS — K59.02 DYSSYNERGIC DEFECATION: ICD-10-CM

## 2025-05-21 PROCEDURE — 99204 OFFICE O/P NEW MOD 45 MIN: CPT | Performed by: PHYSICIAN ASSISTANT

## 2025-05-21 RX ORDER — OMEPRAZOLE 40 MG/1
40 CAPSULE, DELAYED RELEASE ORAL
Qty: 30 CAPSULE | Refills: 5 | Status: SHIPPED | OUTPATIENT
Start: 2025-05-21

## 2025-05-21 RX ORDER — IBUPROFEN 200 MG
600 TABLET ORAL 2 TIMES DAILY
COMMUNITY

## 2025-05-21 RX ORDER — SODIUM, POTASSIUM,MAG SULFATES 17.5-3.13G
1 SOLUTION, RECONSTITUTED, ORAL ORAL ONCE
Qty: 354 ML | Refills: 0 | Status: SHIPPED | OUTPATIENT
Start: 2025-05-21 | End: 2025-05-21

## 2025-05-21 RX ORDER — SODIUM CHLORIDE, SODIUM LACTATE, POTASSIUM CHLORIDE, CALCIUM CHLORIDE 600; 310; 30; 20 MG/100ML; MG/100ML; MG/100ML; MG/100ML
125 INJECTION, SOLUTION INTRAVENOUS CONTINUOUS
OUTPATIENT
Start: 2025-05-21

## 2025-05-21 RX ORDER — SUCRALFATE 1 G/1
1 TABLET ORAL 3 TIMES DAILY PRN
Qty: 90 TABLET | Refills: 3 | Status: SHIPPED | OUTPATIENT
Start: 2025-05-21

## 2025-05-21 RX ORDER — LINACLOTIDE 290 UG/1
290 CAPSULE, GELATIN COATED ORAL
Qty: 30 CAPSULE | Refills: 5 | Status: SHIPPED | OUTPATIENT
Start: 2025-05-21

## 2025-05-21 NOTE — TELEPHONE ENCOUNTER
LM for PT informing that scripts for her urine culture has been placed she can go to the lab and have testing done.

## 2025-05-21 NOTE — H&P (VIEW-ONLY)
Name: Moody Garrido      : 1975      MRN: [id number]  Encounter Provider: Lilly Sharpe PA-C  Encounter Date: 2025   Encounter department: [facility]  :  Assessment & Plan  Rectal bleeding  -recommend colonoscopy at this time for further evaluation to rule out colorectal lesions, assess for hemorrhoids  -discussed procedure, risks including perforation, infection, and bleeding, missing a diagnosis, and benefits in detail with patient   -suprep (patient requested pill version however discussed that I would not recommend this due to her history of peptic ulcer disease.  She was agreeable to doing Suprep but did not want to do GoLytely)  -no med holds   -Discussed ensuring with the patient that she is having regular bowel movements leading up to the colonoscopy so that we can get a good prep  Orders:    Na Sulfate-K Sulfate-Mg Sulf 17.5-3.13-1.6 GM/177ML SOLN; Take 1 kit by mouth once for 1 dose    Colonoscopy; Future    Chronic constipation  - Resume Linzess, will start at 290 mcg as patient does not recall what dose she was on.  If she has a lot of diarrhea with this, we will decrease the dose to 145  Orders:    linaCLOtide (Linzess) 290 MCG CAPS; Take 1 capsule by mouth daily before breakfast    Na Sulfate-K Sulfate-Mg Sulf 17.5-3.13-1.6 GM/177ML SOLN; Take 1 kit by mouth once for 1 dose    Colonoscopy; Future    Dyssynergic defecation  Reports having a positive balloon expulsion test in  in [place].  -Will refer to pelvic floor therapy  Orders:    Ambulatory Referral to Physical Therapy; Future    Colonoscopy; Future    Elevated transaminase level  -mildly elevated with AST 41. This is new but has had mildly elevated bilirubin off and on in the past as well.   -recommend checking RUQ US  Fibrosis-4 (FIB-4) Score is: 1.09        Orders:    US right upper quadrant; Future    Gastroesophageal reflux disease, unspecified whether esophagitis present  -on  omeprazole 40 mg daily and carafate as needed for symptoms, last EGD was in 2022.  Reports a history of peptic ulcer disease as well as potential H. pylori  -recommend EGD at same time as colonoscopy, rule out persistent H pylori, Awan's, gastritis, esophagitis.   -anti-reflux diet and measures   -Continue PPI daily, Carafate as needed   -patient uses NSAIDs twice a day chronically for pain, would recommend trying to decrease the use of NSAIDs    Orders:    omeprazole (PriLOSEC) 40 MG capsule; Take 1 capsule (40 mg total) by mouth daily before breakfast    sucralfate (CARAFATE) 1 g tablet; Take 1 tablet (1 g total) by mouth 3 (three) times a day as needed (abdominal pain)    EGD; Future    History of peptic ulcer  - As above  Orders:    omeprazole (PriLOSEC) 40 MG capsule; Take 1 capsule (40 mg total) by mouth daily before breakfast    EGD; Future    History of Helicobacter pylori infection  - As above  Orders:    EGD; Future        History of Present Illness   HPI  Moody Garrido is a 49 y.o. female history of hypertension, GERD, lumbar radiculopathy, interstitial cystitis, postlaminectomy syndrome, spinal stenosis, and dyssynergic defecation who presents for new patient visit for rectal bleeding and constipation.  Patient was originally from [place] but moved in [place] and then moved back to [place] in 2022.  She reports having a history of dyssynergic defecation and was diagnosed with this with a balloon expulsion test in [place].  She reports that she never completed pelvic floor therapy because the only pelvic floor therapist in the area was on maternity leave when she was diagnosed.  She had been on Linzess in the past but has not been on this since 2022.  When she was taking this she would have a bowel movement daily but it was loose.  Since stopping Linzess, she reports that she has to use a suppository in order to facilitate a bowel movement.  She typically moves her bowels about 5 times a  week but only with suppositories.  She has been having issues with rectal bleeding for the last couple of months.  This occurs a couple times a week.  She typically sees bright red blood in the bowl and with wiping and often will see clots as well.  She sometimes will also see dark stools.  She reports a history of having a colonoscopy around 2017 or 2018 but cannot remember if there was polyps at that time.  She reports that she had an endoscopy and a repeat colonoscopy in 2022.  The endoscopy showed an ulcer but does not know where and believes that she may have had H. pylori because she remembers taking a course of antibiotics but does not recall doing a stool test after this.  Unfortunately the colonoscopy was incomplete due to a poor prep but she was not on Linzess at that time.  Reports a family history of colon cancer in her cousin but no first-degree relatives.  She reports that she has been on PPI as well as Carafate since 2021.  She reports that her acid reflux is well-controlled on the PPI.  She uses the Carafate 1-2 times a week if she has a flareup of her stomach symptoms.  She continues to use Advil twice a day for pain.  Denies any nausea or vomiting.  Denies any dysphagia.  Reports a good appetite.  Denies any unexplained weight loss.  She has about 2-3 alcoholic beverages per day on the weekends.  Denies tobacco use.  She is disabled.      Review of Systems   Constitutional:  Negative for activity change, appetite change, fever and unexpected weight change.   HENT:  Negative for drooling, mouth sores, sore throat, trouble swallowing and voice change.    Eyes:  Negative for pain, discharge and visual disturbance.   Respiratory:  Negative for cough, choking, chest tightness and shortness of breath.    Cardiovascular:  Negative for chest pain, palpitations and leg swelling.   Gastrointestinal:  Positive for blood in stool and constipation. Negative for abdominal distention, abdominal pain, anal  "bleeding, diarrhea, nausea, rectal pain and vomiting.   Genitourinary:  Positive for dysuria. Negative for decreased urine volume, difficulty urinating, flank pain and urgency.   Musculoskeletal:  Positive for back pain. Negative for arthralgias, gait problem, myalgias and neck stiffness.   Skin:  Negative for color change, pallor and rash.   Neurological:  Negative for dizziness, syncope and light-headedness.   Hematological:  Negative for adenopathy.   Psychiatric/Behavioral:  Negative for confusion. The patient is not nervous/anxious.           Objective   /92 (BP Location: Right arm, Patient Position: Sitting, Cuff Size: Standard)   Pulse 78   Ht 5' 8\" (1.727 m)   Wt 89.4 kg (197 lb)   LMP 08/15/2023 (Approximate) Comment: post menapausal  BMI 29.95 kg/m²      Physical Exam  Constitutional:       General: She is not in acute distress.     Appearance: Normal appearance. She is well-developed.   HENT:      Head: Normocephalic and atraumatic.      Mouth/Throat:      Mouth: Mucous membranes are moist.     Eyes:      General: No scleral icterus.    Neck:      Trachea: No tracheal deviation.     Cardiovascular:      Rate and Rhythm: Normal rate and regular rhythm.      Heart sounds: Normal heart sounds. No murmur heard.  Pulmonary:      Effort: Pulmonary effort is normal. No respiratory distress.      Breath sounds: Normal breath sounds. No wheezing or rales.   Abdominal:      General: Bowel sounds are normal. There is no distension.      Palpations: Abdomen is soft. There is no mass.      Tenderness: There is no abdominal tenderness. There is no guarding or rebound.     Musculoskeletal:         General: Normal range of motion.      Cervical back: Normal range of motion and neck supple.     Skin:     General: Skin is warm and dry.      Coloration: Skin is not pale.      Findings: No rash.     Neurological:      Mental Status: She is alert and oriented to person, place, and time. Mental status is at " baseline.     Psychiatric:         Mood and Affect: Mood normal.         Behavior: Behavior normal.

## 2025-05-21 NOTE — PROGRESS NOTES
Name: Moody Garrido      : 1975      MRN: [id number]  Encounter Provider: Lilly Sharpe PA-C  Encounter Date: 2025   Encounter department: [facility]  :  Assessment & Plan  Rectal bleeding  -recommend colonoscopy at this time for further evaluation to rule out colorectal lesions, assess for hemorrhoids  -discussed procedure, risks including perforation, infection, and bleeding, missing a diagnosis, and benefits in detail with patient   -suprep (patient requested pill version however discussed that I would not recommend this due to her history of peptic ulcer disease.  She was agreeable to doing Suprep but did not want to do GoLytely)  -no med holds   -Discussed ensuring with the patient that she is having regular bowel movements leading up to the colonoscopy so that we can get a good prep  Orders:    Na Sulfate-K Sulfate-Mg Sulf 17.5-3.13-1.6 GM/177ML SOLN; Take 1 kit by mouth once for 1 dose    Colonoscopy; Future    Chronic constipation  - Resume Linzess, will start at 290 mcg as patient does not recall what dose she was on.  If she has a lot of diarrhea with this, we will decrease the dose to 145  Orders:    linaCLOtide (Linzess) 290 MCG CAPS; Take 1 capsule by mouth daily before breakfast    Na Sulfate-K Sulfate-Mg Sulf 17.5-3.13-1.6 GM/177ML SOLN; Take 1 kit by mouth once for 1 dose    Colonoscopy; Future    Dyssynergic defecation  Reports having a positive balloon expulsion test in  in [place].  -Will refer to pelvic floor therapy  Orders:    Ambulatory Referral to Physical Therapy; Future    Colonoscopy; Future    Elevated transaminase level  -mildly elevated with AST 41. This is new but has had mildly elevated bilirubin off and on in the past as well.   -recommend checking RUQ US  Fibrosis-4 (FIB-4) Score is: 1.09        Orders:    US right upper quadrant; Future    Gastroesophageal reflux disease, unspecified whether esophagitis present  -on  omeprazole 40 mg daily and carafate as needed for symptoms, last EGD was in 2022.  Reports a history of peptic ulcer disease as well as potential H. pylori  -recommend EGD at same time as colonoscopy, rule out persistent H pylori, Awan's, gastritis, esophagitis.   -anti-reflux diet and measures   -Continue PPI daily, Carafate as needed   -patient uses NSAIDs twice a day chronically for pain, would recommend trying to decrease the use of NSAIDs    Orders:    omeprazole (PriLOSEC) 40 MG capsule; Take 1 capsule (40 mg total) by mouth daily before breakfast    sucralfate (CARAFATE) 1 g tablet; Take 1 tablet (1 g total) by mouth 3 (three) times a day as needed (abdominal pain)    EGD; Future    History of peptic ulcer  - As above  Orders:    omeprazole (PriLOSEC) 40 MG capsule; Take 1 capsule (40 mg total) by mouth daily before breakfast    EGD; Future    History of Helicobacter pylori infection  - As above  Orders:    EGD; Future        History of Present Illness   HPI  Moody Garrido is a 49 y.o. female history of hypertension, GERD, lumbar radiculopathy, interstitial cystitis, postlaminectomy syndrome, spinal stenosis, and dyssynergic defecation who presents for new patient visit for rectal bleeding and constipation.  Patient was originally from [place] but moved in [place] and then moved back to [place] in 2022.  She reports having a history of dyssynergic defecation and was diagnosed with this with a balloon expulsion test in [place].  She reports that she never completed pelvic floor therapy because the only pelvic floor therapist in the area was on maternity leave when she was diagnosed.  She had been on Linzess in the past but has not been on this since 2022.  When she was taking this she would have a bowel movement daily but it was loose.  Since stopping Linzess, she reports that she has to use a suppository in order to facilitate a bowel movement.  She typically moves her bowels about 5 times a  week but only with suppositories.  She has been having issues with rectal bleeding for the last couple of months.  This occurs a couple times a week.  She typically sees bright red blood in the bowl and with wiping and often will see clots as well.  She sometimes will also see dark stools.  She reports a history of having a colonoscopy around 2017 or 2018 but cannot remember if there was polyps at that time.  She reports that she had an endoscopy and a repeat colonoscopy in 2022.  The endoscopy showed an ulcer but does not know where and believes that she may have had H. pylori because she remembers taking a course of antibiotics but does not recall doing a stool test after this.  Unfortunately the colonoscopy was incomplete due to a poor prep but she was not on Linzess at that time.  Reports a family history of colon cancer in her cousin but no first-degree relatives.  She reports that she has been on PPI as well as Carafate since 2021.  She reports that her acid reflux is well-controlled on the PPI.  She uses the Carafate 1-2 times a week if she has a flareup of her stomach symptoms.  She continues to use Advil twice a day for pain.  Denies any nausea or vomiting.  Denies any dysphagia.  Reports a good appetite.  Denies any unexplained weight loss.  She has about 2-3 alcoholic beverages per day on the weekends.  Denies tobacco use.  She is disabled.      Review of Systems   Constitutional:  Negative for activity change, appetite change, fever and unexpected weight change.   HENT:  Negative for drooling, mouth sores, sore throat, trouble swallowing and voice change.    Eyes:  Negative for pain, discharge and visual disturbance.   Respiratory:  Negative for cough, choking, chest tightness and shortness of breath.    Cardiovascular:  Negative for chest pain, palpitations and leg swelling.   Gastrointestinal:  Positive for blood in stool and constipation. Negative for abdominal distention, abdominal pain, anal  "bleeding, diarrhea, nausea, rectal pain and vomiting.   Genitourinary:  Positive for dysuria. Negative for decreased urine volume, difficulty urinating, flank pain and urgency.   Musculoskeletal:  Positive for back pain. Negative for arthralgias, gait problem, myalgias and neck stiffness.   Skin:  Negative for color change, pallor and rash.   Neurological:  Negative for dizziness, syncope and light-headedness.   Hematological:  Negative for adenopathy.   Psychiatric/Behavioral:  Negative for confusion. The patient is not nervous/anxious.           Objective   /92 (BP Location: Right arm, Patient Position: Sitting, Cuff Size: Standard)   Pulse 78   Ht 5' 8\" (1.727 m)   Wt 89.4 kg (197 lb)   LMP 08/15/2023 (Approximate) Comment: post menapausal  BMI 29.95 kg/m²      Physical Exam  Constitutional:       General: She is not in acute distress.     Appearance: Normal appearance. She is well-developed.   HENT:      Head: Normocephalic and atraumatic.      Mouth/Throat:      Mouth: Mucous membranes are moist.     Eyes:      General: No scleral icterus.    Neck:      Trachea: No tracheal deviation.     Cardiovascular:      Rate and Rhythm: Normal rate and regular rhythm.      Heart sounds: Normal heart sounds. No murmur heard.  Pulmonary:      Effort: Pulmonary effort is normal. No respiratory distress.      Breath sounds: Normal breath sounds. No wheezing or rales.   Abdominal:      General: Bowel sounds are normal. There is no distension.      Palpations: Abdomen is soft. There is no mass.      Tenderness: There is no abdominal tenderness. There is no guarding or rebound.     Musculoskeletal:         General: Normal range of motion.      Cervical back: Normal range of motion and neck supple.     Skin:     General: Skin is warm and dry.      Coloration: Skin is not pale.      Findings: No rash.     Neurological:      Mental Status: She is alert and oriented to person, place, and time. Mental status is at " baseline.     Psychiatric:         Mood and Affect: Mood normal.         Behavior: Behavior normal.

## 2025-05-21 NOTE — PATIENT INSTRUCTIONS
Scheduled date of EGD/colonoscopy (as of today):June 12, 2025  Physician performing EGD/colonoscopy:Dr. Luna  Location of EGD/colonoscopy:[facility]  Desired bowel prep reviewed with patient:Suprep  Instructions reviewed with patient by:NAMITA  Clearances:  NA

## 2025-05-27 ENCOUNTER — CLINICAL SUPPORT (OUTPATIENT)
Dept: FAMILY MEDICINE CLINIC | Facility: CLINIC | Age: 50
End: 2025-05-27
Payer: COMMERCIAL

## 2025-05-27 DIAGNOSIS — R39.9 UTI SYMPTOMS: Primary | ICD-10-CM

## 2025-05-27 DIAGNOSIS — N39.0 RECURRENT UTI: ICD-10-CM

## 2025-05-27 DIAGNOSIS — N30.00 ACUTE CYSTITIS WITHOUT HEMATURIA: Primary | ICD-10-CM

## 2025-05-27 LAB
SL AMB  POCT GLUCOSE, UA: NORMAL
SL AMB LEUKOCYTE ESTERASE,UA: NEGATIVE
SL AMB POCT BILIRUBIN,UA: 3
SL AMB POCT BLOOD,UA: NEGATIVE
SL AMB POCT CLARITY,UA: ABNORMAL
SL AMB POCT COLOR,UA: ABNORMAL
SL AMB POCT KETONES,UA: NEGATIVE
SL AMB POCT NITRITE,UA: POSITIVE
SL AMB POCT PH,UA: 5
SL AMB POCT SPECIFIC GRAVITY,UA: 1.01
SL AMB POCT URINE PROTEIN: 500
SL AMB POCT UROBILINOGEN: >12

## 2025-05-27 PROCEDURE — 87086 URINE CULTURE/COLONY COUNT: CPT | Performed by: STUDENT IN AN ORGANIZED HEALTH CARE EDUCATION/TRAINING PROGRAM

## 2025-05-27 PROCEDURE — 81002 URINALYSIS NONAUTO W/O SCOPE: CPT

## 2025-05-28 LAB — BACTERIA UR CULT: NORMAL

## 2025-05-29 ENCOUNTER — RESULTS FOLLOW-UP (OUTPATIENT)
Dept: FAMILY MEDICINE CLINIC | Facility: CLINIC | Age: 50
End: 2025-05-29

## 2025-05-29 ENCOUNTER — ANESTHESIA EVENT (OUTPATIENT)
Dept: ANESTHESIOLOGY | Facility: HOSPITAL | Age: 50
End: 2025-05-29

## 2025-05-29 ENCOUNTER — ANESTHESIA (OUTPATIENT)
Dept: ANESTHESIOLOGY | Facility: HOSPITAL | Age: 50
End: 2025-05-29

## 2025-06-02 ENCOUNTER — TELEPHONE (OUTPATIENT)
Age: 50
End: 2025-06-02

## 2025-06-03 DIAGNOSIS — I10 PRIMARY HYPERTENSION: ICD-10-CM

## 2025-06-04 RX ORDER — RAMIPRIL 10 MG/1
10 CAPSULE ORAL DAILY
Qty: 90 CAPSULE | Refills: 0 | Status: SHIPPED | OUTPATIENT
Start: 2025-06-04

## 2025-06-04 NOTE — TELEPHONE ENCOUNTER
Pt is aware and understands. Please call in medication just in case.  No further action required.

## 2025-06-04 NOTE — TELEPHONE ENCOUNTER
Pt called again because she did not hear anything back about this. Pt is leaving on Friday morning and will need this before then. Thank you.

## 2025-06-04 NOTE — TELEPHONE ENCOUNTER
Please let patient know that Urine cx came back negative so no growth noted with urine cx therefore antibiotic is not necessary. Patient should increase fluid hydration and continue pyridium. If patient is traveling I can call in antibiotic and diflucan for patient to have on hand.

## 2025-06-10 ENCOUNTER — TELEPHONE (OUTPATIENT)
Age: 50
End: 2025-06-10

## 2025-06-10 NOTE — TELEPHONE ENCOUNTER
Scheduled date of colonoscopy (as of today): 6/16/25    Physician performing colonoscopy: Jeremy    Location of colonoscopy: [facility]    Bowel prep reviewed with patient: Suprep  Instructions reviewed with patient by:jeanne  Clearances: na    Rescheduled from 6/12 due to illness

## 2025-06-14 RX ORDER — SODIUM CHLORIDE, SODIUM LACTATE, POTASSIUM CHLORIDE, CALCIUM CHLORIDE 600; 310; 30; 20 MG/100ML; MG/100ML; MG/100ML; MG/100ML
125 INJECTION, SOLUTION INTRAVENOUS CONTINUOUS
Status: CANCELLED | OUTPATIENT
Start: 2025-06-14

## 2025-06-16 ENCOUNTER — ANESTHESIA EVENT (OUTPATIENT)
Dept: GASTROENTEROLOGY | Facility: AMBULARY SURGERY CENTER | Age: 50
End: 2025-06-16
Payer: COMMERCIAL

## 2025-06-16 ENCOUNTER — HOSPITAL ENCOUNTER (OUTPATIENT)
Dept: GASTROENTEROLOGY | Facility: AMBULARY SURGERY CENTER | Age: 50
Setting detail: OUTPATIENT SURGERY
Discharge: HOME | End: 2025-06-16
Attending: PHYSICIAN ASSISTANT
Payer: COMMERCIAL

## 2025-06-16 VITALS
SYSTOLIC BLOOD PRESSURE: 121 MMHG | HEART RATE: 67 BPM | TEMPERATURE: 98.3 F | DIASTOLIC BLOOD PRESSURE: 73 MMHG | RESPIRATION RATE: 16 BRPM | OXYGEN SATURATION: 99 %

## 2025-06-16 DIAGNOSIS — K62.5 RECTAL BLEEDING: ICD-10-CM

## 2025-06-16 DIAGNOSIS — K21.9 GASTROESOPHAGEAL REFLUX DISEASE, UNSPECIFIED WHETHER ESOPHAGITIS PRESENT: ICD-10-CM

## 2025-06-16 DIAGNOSIS — Z86.19 HISTORY OF HELICOBACTER PYLORI INFECTION: ICD-10-CM

## 2025-06-16 DIAGNOSIS — K59.02 DYSSYNERGIC DEFECATION: ICD-10-CM

## 2025-06-16 DIAGNOSIS — K59.09 CHRONIC CONSTIPATION: ICD-10-CM

## 2025-06-16 DIAGNOSIS — Z87.11 HISTORY OF PEPTIC ULCER: ICD-10-CM

## 2025-06-16 PROCEDURE — 45378 DIAGNOSTIC COLONOSCOPY: CPT | Performed by: INTERNAL MEDICINE

## 2025-06-16 PROCEDURE — 43239 EGD BIOPSY SINGLE/MULTIPLE: CPT | Performed by: INTERNAL MEDICINE

## 2025-06-16 PROCEDURE — 88305 TISSUE EXAM BY PATHOLOGIST: CPT | Performed by: PATHOLOGY

## 2025-06-16 RX ORDER — ONDANSETRON 2 MG/ML
4 INJECTION INTRAMUSCULAR; INTRAVENOUS ONCE AS NEEDED
Status: CANCELLED | OUTPATIENT
Start: 2025-06-16

## 2025-06-16 RX ORDER — SODIUM CHLORIDE, SODIUM LACTATE, POTASSIUM CHLORIDE, CALCIUM CHLORIDE 600; 310; 30; 20 MG/100ML; MG/100ML; MG/100ML; MG/100ML
125 INJECTION, SOLUTION INTRAVENOUS CONTINUOUS
Status: DISCONTINUED | OUTPATIENT
Start: 2025-06-16 | End: 2025-06-16 | Stop reason: SDUPTHER

## 2025-06-16 RX ORDER — SODIUM CHLORIDE, SODIUM LACTATE, POTASSIUM CHLORIDE, CALCIUM CHLORIDE 600; 310; 30; 20 MG/100ML; MG/100ML; MG/100ML; MG/100ML
125 INJECTION, SOLUTION INTRAVENOUS CONTINUOUS
Status: DISCONTINUED | OUTPATIENT
Start: 2025-06-16 | End: 2025-06-20 | Stop reason: HOSPADM

## 2025-06-16 RX ORDER — PROPOFOL 10 MG/ML
INJECTION, EMULSION INTRAVENOUS AS NEEDED
Status: DISCONTINUED | OUTPATIENT
Start: 2025-06-16 | End: 2025-06-16

## 2025-06-16 RX ADMIN — SODIUM CHLORIDE, SODIUM LACTATE, POTASSIUM CHLORIDE, AND CALCIUM CHLORIDE: .6; .31; .03; .02 INJECTION, SOLUTION INTRAVENOUS at 08:44

## 2025-06-16 RX ADMIN — PROPOFOL 150 MG: 10 INJECTION, EMULSION INTRAVENOUS at 08:43

## 2025-06-16 RX ADMIN — PROPOFOL 120 MCG/KG/MIN: 10 INJECTION, EMULSION INTRAVENOUS at 08:55

## 2025-06-16 RX ADMIN — PROPOFOL 50 MG: 10 INJECTION, EMULSION INTRAVENOUS at 08:46

## 2025-06-16 RX ADMIN — PROPOFOL 50 MG: 10 INJECTION, EMULSION INTRAVENOUS at 08:49

## 2025-06-16 RX ADMIN — PROPOFOL 50 MG: 10 INJECTION, EMULSION INTRAVENOUS at 08:51

## 2025-06-16 NOTE — ANESTHESIA PREPROCEDURE EVALUATION
Procedure:  COLONOSCOPY  EGD    Relevant Problems   ANESTHESIA   (+) PONV (postoperative nausea and vomiting)      CARDIO   (+) Primary hypertension      ENDO (within normal limits)      GI/HEPATIC   (+) Gastroesophageal reflux disease      MUSCULOSKELETAL   (+) Failed back syndrome      NEURO/PSYCH   (+) IC (interstitial cystitis)      PULMONARY (within normal limits)        Physical Exam    Airway     Mallampati score: II  TM Distance: >3 FB  Neck ROM: full  Mouth opening: >= 4 cm      Cardiovascular  Rhythm: regular, Rate: normal    Dental        Pulmonary   Breath sounds clear to auscultation    Neurological    She appears awake, alert and oriented x3.      Other Findings  post-pubertal.      Anesthesia Plan  ASA Score- 2     Anesthesia Type- IV sedation with anesthesia with ASA Monitors.         Additional Monitors:     Airway Plan:            Plan Factors-Exercise tolerance (METS): >4 METS.    Chart reviewed. EKG reviewed.   Patient summary reviewed.    Patient is not a current smoker.              Induction-     Postoperative Plan- .   Monitoring Plan - Monitoring plan - standard ASA monitoring  Post Operative Pain Plan - non-opiod analgesics        Informed Consent- Anesthetic plan and risks discussed with patient.  I personally reviewed this patient with the CRNA. Discussed and agreed on the Anesthesia Plan with the CRNA..      NPO Status:  Vitals Value Taken Time   Date of last liquid 06/16/25 06/16/25 07:25   Time of last liquid 0500 06/16/25 07:21   Date of last solid 06/14/25 06/16/25 07:21   Time of last solid 1700 06/16/25 07:21

## 2025-06-16 NOTE — ANESTHESIA POSTPROCEDURE EVALUATION
Post-Op Assessment Note    CV Status:  Stable  Pain Score: 0    Pain management: adequate       Mental Status:  Awake   Hydration Status:  Stable   PONV Controlled:  Controlled   Airway Patency:  Patent  Two or more mitigation strategies used for obstructive sleep apnea   Post Op Vitals Reviewed: Yes    No anethesia notable event occurred.    Staff: CRNA           Last Filed PACU Vitals:  Vitals Value Taken Time   Temp     Pulse 82    /64    Resp 14    SpO2 98

## 2025-06-16 NOTE — INTERVAL H&P NOTE
H&P reviewed. After examining the patient I find no changes in the patients condition since the H&P had been written.    Vitals:    06/16/25 0728   BP: 136/85   Pulse: 71   Resp: 18   Temp: 98.3 °F (36.8 °C)   SpO2: 99%

## 2025-06-19 ENCOUNTER — RESULTS FOLLOW-UP (OUTPATIENT)
Dept: GASTROENTEROLOGY | Facility: AMBULARY SURGERY CENTER | Age: 50
End: 2025-06-19

## 2025-06-19 PROCEDURE — 88305 TISSUE EXAM BY PATHOLOGIST: CPT | Performed by: PATHOLOGY

## 2025-06-27 ENCOUNTER — TELEPHONE (OUTPATIENT)
Dept: UROLOGY | Facility: CLINIC | Age: 50
End: 2025-06-27

## 2025-06-27 DIAGNOSIS — N39.0 RECURRENT UTI: Primary | ICD-10-CM

## 2025-06-27 RX ORDER — NITROFURANTOIN MACROCRYSTALS 50 MG/1
CAPSULE ORAL
Qty: 30 CAPSULE | Refills: 3 | Status: SHIPPED | OUTPATIENT
Start: 2025-06-27

## 2025-06-27 NOTE — TELEPHONE ENCOUNTER
Patient called the RX Refill Line. Message is being forwarded to the office.     Patient is requesting a prescription for nitrofurantoin (MACRODANTIN) 50 mg capsule. This medications was prescribed in April with refills. Someone discontinued the medication and patient is unable to get her refills.     Please contact patient at  [phone]

## 2025-07-09 ENCOUNTER — EVALUATION (OUTPATIENT)
Dept: PHYSICAL THERAPY | Facility: CLINIC | Age: 50
End: 2025-07-09
Payer: COMMERCIAL

## 2025-07-09 DIAGNOSIS — N30.10 INTERSTITIAL CYSTITIS: ICD-10-CM

## 2025-07-09 DIAGNOSIS — K59.02 DYSSYNERGIC DEFECATION: ICD-10-CM

## 2025-07-09 DIAGNOSIS — K59.00 CONSTIPATION, UNSPECIFIED CONSTIPATION TYPE: Primary | ICD-10-CM

## 2025-07-09 PROCEDURE — 97162 PT EVAL MOD COMPLEX 30 MIN: CPT

## 2025-07-09 PROCEDURE — 97530 THERAPEUTIC ACTIVITIES: CPT

## 2025-07-09 NOTE — PROGRESS NOTES
PT Evaluation     Today's date: 2025  Patient name: Moody Garrido  : 1975  MRN: [id number]  Referring provider: Katarina Gonsalez MD  Dx:   Encounter Diagnosis     ICD-10-CM    1. Constipation, unspecified constipation type  K59.00       2. Dyssynergic defecation  K59.02 Ambulatory Referral to Physical Therapy      3. Interstitial cystitis  N30.10           Start Time: 0900  Stop Time: 1000  Total time in clinic (min): 60 minutes    Assessment  Impairments: abnormal coordination, abnormal muscle firing, abnormal muscle tone, activity intolerance, impaired physical strength, lacks appropriate home exercise program, poor posture  and poor body mechanics    Assessment details: Moody Garrido is a 49 y.o. year old female who reports onset of symptoms: constipation, difficulty having BM (endorses 30 suppositories daily), dyssynergic defecation, interstitial cystitis, pain related to endometriosis, pain with intercourse. Patient describes symptoms as: frustrating. POC was discussed and agreed upon with patient.  Patient was educated on: pelvic floor anatomy, Importance of body mechanics and form with toileting, healthy bowel and bladder habits, urge deferral strategies and PT exam and course of treatment.  Patient vocalized a good understanding of  POC and HEP issued. Patient would benefit from skilled physical therapy services to address their aforementioned functional limitations and progress towards prior level of function and independence with home exercise program.      Plan  Patient would benefit from: skilled physical therapy, PT eval and women's health eval  Planned modality interventions: biofeedback, cryotherapy, TENS, ultrasound and hydrotherapy    Planned therapy interventions: abdominal trunk stabilization, activity modification, ADL retraining, manual therapy, joint mobilization, muscle pump exercises, neuromuscular re-education, balance/weight bearing training, behavior modification, body  mechanics training, Franks taping, motor coordination training, balance, patient education, postural training, breathing training, stretching, strengthening, therapeutic activities, therapeutic exercise, functional ROM exercises, therapeutic training, flexibility, home exercise program and gait training    Frequency: 1x week  Duration in weeks: 8  Plan of Care beginning date: 7/9/2025  Plan of Care expiration date: 9/3/2025  Treatment plan discussed with: family, patient and PCP        PT Pelvic Floor Subjective:   History of Present Illness:   Pt presents with referring diagnosis of dyssynergic defecation and c/o unable to independently have a BM, using 30 suppositories to go at all for several years. Pt used mirilax and Linzess (did help in the past but too expensive). Pt with chronic history of constipation and interstitial cystitis. Hx of Vince from L3-S1 and failed back syndrome after falling down stairs several years ago.   Diet and Exercise:      Exercise type: walking    getting back into running ~5 mi per day  OB/ gyn History    Gestational History:     Number of prior pregnancies: 0    Menstrual History:    Date of last menstrual period: 5/5/2024    Painful periods:  Difficulty managing menstrual pain    Tolerates tampons: yes  Endometriosis   Bladder Function:     Voiding Difficulties positive for: urgency, frequent urination, hesitancy, straining and paruresis       Voiding Difficulties comments:     Urinary leakage: urine leakage    Urinary leakage aggravated by: coughing, sneezing, key-in-the-door syndrome and laughing    Painful urination: Yes (80% of the time)      Fluid Intake Type:  Water    Intake (ounces): Water: 100,   Bowel Function:     Bowel frequency: every 2 days    Smith Stool Scale: type 7 and type 1varies. Can have blood in stool:varies :  Sexual Function:     Pain during intercourse: Yes      Lubrication use during intercourse: trying to find one that does not yield UTI.  Patient  Goals:     Patient goals for therapy:  Improved bladder or bowel function      Objective       Abdominal Assessment:        Visual Inspection of Perineum:   Excursion of perineal body in cephalad direction with contraction of pelvic floor muscles (PFM): fair   Excursion of perineal body in caudal direction with relaxation of pelvic floor muscles (PFM): fair   Involuntary contraction with coughing: yes  Involuntary relaxation with bearing down: no  Cough reflex: cough reflex  Sensation: intact  Tenderness: provoked    Pelvic Organ Prolapse   Position: hook-lying  At rest: none  With bearing down: mild (>1cm from hymenal remnants)  Location: midline vaginal  Perineal body inspection: within normal limits        Pelvic Floor Muscle Exam:      Breathing pattern with contraction: holding breath   Pelvic floor muscle relaxation is delayed and incomplete.   75% pelvic floor relaxation   5 seconds required for complete relaxation.        PERFECT Score   Power right: 3+/5   Power left: 3+/5   Endurance (seconds to max): 4   Repetitions (before fatigue): 10   Fast flicks (in 10 seconds): 7       pelvic floor exam consent given by patient    Pelvic exam completed: vaginally                Insurance Eval/ Re-eval POC expires Auth Status Total visits  Start date  Expiration date Misc   [employer] MCR BOMN  PFDI 203.13    $25 copay               Precautions: standard  Date: 7/9         Session: IE 2 3 4      Manuals          PFM exam Performed          Abdominal mobilizations           Colonic massage ileocecal valve, sigmoid, colon          Scar mobilizations          Joint mobilizations           MFR: stacking, obt int, piriformis, glutes, adductors          TPR          Cupping, K tape, aura wave          Neuro Re-Ed          360 breathing          Diaphragmatic breathing          TA engagement          TA+ PFC: bridges, BKFO, hooklying add squeeze, *SLR, clamshell          Pressure management: TA with weight to shelf, monster  walks, paloff press, staggered stance rotation           PFC +: sit to stand, step ups, standing marches, SLS on foam          QPED: fire hydrants, hip hikes          Seated physioball: tilts, clocks, marches,           SL: inominate rotations, mod clamshells                                         Biofeedback           Ther Ex          Thread the needle           Open books           Lord of the half fish          Multifidi rotation in sitting, stag stance, with TB  Single arm row w twist in stag stance          Happy baby          butterfly          Deep squat          Cat-cow           RA: incline planks, bird dogs           Ther Activity          Voiding diary Reviewed tracking length of urination (timed seconds), variability related to stress          Knack          Fiber intake education: 25- 40 g          Bowel habits education: Frequency: 3x/day - 3x/week (higher frequency in men) Reviewed: consistent mealtimes, gastrocolonic reflex         Bladder irritants education  Reviewed          Toileting posture  Reviewed  - squatty potty with DBE          Urge deferral strategies Reviewed          Dilator/wand education           Increasing water intake education  Reviewed proper hydration          Mindfulness education                     Gait training           Modalities

## 2025-07-23 ENCOUNTER — OFFICE VISIT (OUTPATIENT)
Dept: PHYSICAL THERAPY | Facility: CLINIC | Age: 50
End: 2025-07-23
Attending: STUDENT IN AN ORGANIZED HEALTH CARE EDUCATION/TRAINING PROGRAM
Payer: COMMERCIAL

## 2025-07-23 DIAGNOSIS — N30.10 INTERSTITIAL CYSTITIS: ICD-10-CM

## 2025-07-23 DIAGNOSIS — K59.00 CONSTIPATION, UNSPECIFIED CONSTIPATION TYPE: ICD-10-CM

## 2025-07-23 DIAGNOSIS — K59.02 DYSSYNERGIC DEFECATION: Primary | ICD-10-CM

## 2025-07-23 PROCEDURE — 97140 MANUAL THERAPY 1/> REGIONS: CPT

## 2025-07-23 PROCEDURE — 97112 NEUROMUSCULAR REEDUCATION: CPT

## 2025-07-23 NOTE — PROGRESS NOTES
Daily Note     Today's date: 2025  Patient name: oMody Garrido  : 1975  MRN: [id number]  Referring provider: Katarina Gonsalez MD  Dx:   Encounter Diagnosis     ICD-10-CM    1. Dyssynergic defecation  K59.02       2. Constipation, unspecified constipation type  K59.00       3. Interstitial cystitis  N30.10           Start Time: 0900  Stop Time: 09  Total time in clinic (min): 38 minutes    Subjective: Patient presents with improvements in bowel movements, 3 times independently with squatty potty! Pt endorses belly breathing for urination which she has noticed helps. Pt endorses not delaying the urge for BM. Patient ordered senna but is waiting for time to dedicate trialing this at home.       Objective: See treatment diary below      Assessment: Tolerated treatment well. Patient with cramping during PEMF and manual work. During this, pt attempted to have BM 2x however was unable. Patient with improved diaphragmatic excursion with tactile prompting. Session ended early due to pt requiring to use suppositories at home for BM secondary to cramping. Patient educated on pressure management techniques to work on at home. Patient would benefit from continued PT to facilitate gut motility, downtraining, pressure management and provide education to reduce symptoms and limitations listed in evaluation.       Plan: Continue per plan of care.  Progress treatment as tolerated.         Insurance Eval/ Re-eval POC expires Auth Status Total visits  Start date  Expiration date Misc   [employer] MCR BOMN  PFDI 203.13    $25 copay       #: [id number] 8  25      Precautions: standard  Date:         Session: IE 2 3 4      Manuals          PFM exam Performed          Abdominal mobilizations           Colonic massage ileocecal valve, sigmoid, colon  Focus on iliocecal valve, transverse colon x10'        Scar mobilizations          Joint mobilizations           MFR: stacking, obt int, piriformis, glutes, adductors           TPR          Cupping, K tape, aura wave          Neuro Re-Ed          360 breathing  PEMF with DBE hooklying x15' (tactile prompting chest and diaphragm)        Diaphragmatic breathing          TA engagement          TA+ PFC: bridges, BKFO, hooklying add squeeze, *SLR, clamshell          Pressure management: TA with weight to shelf, monster walks, paloff press, staggered stance rotation   Pressure management: sit to stand with DBE and PFC         PFC +: sit to stand, step ups, standing marches, SLS on foam          QPED: fire hydrants, hip hikes          Seated physioball: tilts, clocks, marches,           SL: inominate rotations, mod clamshells                                         Biofeedback           Ther Ex          Thread the needle           Open books           Lord of the half fish          Multifidi rotation in sitting, stag stance, with TB  Single arm row w twist in stag stance          Happy baby          butterfly          Deep squat          Cat-cow           RA: incline planks, bird dogs           Ther Activity          Voiding diary Reviewed tracking length of urination (timed seconds), variability related to stress          Knack          Fiber intake education: 25- 40 g          Bowel habits education: Frequency: 3x/day - 3x/week (higher frequency in men) Reviewed: consistent mealtimes, gastrocolonic reflex         Bladder irritants education  Reviewed          Toileting posture  Reviewed  - squatty potty with DBE          Urge deferral strategies Reviewed          Dilator/wand education           Increasing water intake education  Reviewed proper hydration          Mindfulness education                     Gait training           Modalities

## 2025-07-24 ENCOUNTER — OFFICE VISIT (OUTPATIENT)
Dept: FAMILY MEDICINE CLINIC | Facility: CLINIC | Age: 50
End: 2025-07-24
Payer: COMMERCIAL

## 2025-07-24 VITALS
HEIGHT: 68 IN | WEIGHT: 197 LBS | HEART RATE: 81 BPM | RESPIRATION RATE: 16 BRPM | DIASTOLIC BLOOD PRESSURE: 88 MMHG | BODY MASS INDEX: 29.86 KG/M2 | TEMPERATURE: 97.7 F | OXYGEN SATURATION: 97 % | SYSTOLIC BLOOD PRESSURE: 130 MMHG

## 2025-07-24 DIAGNOSIS — R39.9 UTI SYMPTOMS: ICD-10-CM

## 2025-07-24 DIAGNOSIS — N30.00 ACUTE CYSTITIS WITHOUT HEMATURIA: Primary | ICD-10-CM

## 2025-07-24 LAB
SL AMB  POCT GLUCOSE, UA: 0
SL AMB LEUKOCYTE ESTERASE,UA: 25
SL AMB POCT BILIRUBIN,UA: 3
SL AMB POCT BLOOD,UA: 0
SL AMB POCT CLARITY,UA: CLEAR
SL AMB POCT COLOR,UA: ABNORMAL
SL AMB POCT KETONES,UA: 0
SL AMB POCT NITRITE,UA: POSITIVE
SL AMB POCT PH,UA: 5
SL AMB POCT SPECIFIC GRAVITY,UA: 1.02
SL AMB POCT URINE PROTEIN: 100
SL AMB POCT UROBILINOGEN: 4

## 2025-07-24 PROCEDURE — 87086 URINE CULTURE/COLONY COUNT: CPT | Performed by: STUDENT IN AN ORGANIZED HEALTH CARE EDUCATION/TRAINING PROGRAM

## 2025-07-24 PROCEDURE — 81003 URINALYSIS AUTO W/O SCOPE: CPT | Performed by: STUDENT IN AN ORGANIZED HEALTH CARE EDUCATION/TRAINING PROGRAM

## 2025-07-24 PROCEDURE — 99213 OFFICE O/P EST LOW 20 MIN: CPT | Performed by: STUDENT IN AN ORGANIZED HEALTH CARE EDUCATION/TRAINING PROGRAM

## 2025-07-24 RX ORDER — NITROFURANTOIN 25; 75 MG/1; MG/1
100 CAPSULE ORAL 2 TIMES DAILY
Qty: 14 CAPSULE | Refills: 0 | Status: SHIPPED | OUTPATIENT
Start: 2025-07-24 | End: 2025-07-31

## 2025-07-24 NOTE — PROGRESS NOTES
"Name: Moody Garrido      : 1975      MRN: [id number]  Encounter Provider: Katarina Gonsalez MD  Encounter Date: 2025   Encounter department: [facility]  :  Assessment & Plan  UTI symptoms    Orders:  •  POCT urine dip auto non-scope  •  Urine culture    Acute cystitis without hematuria    Orders:  •  nitrofurantoin (MACROBID) 100 mg capsule; Take 1 capsule (100 mg total) by mouth 2 (two) times a day for 7 days  •  Urine culture    Increase fluid hydration  Complete antibiotic  Monitor for fever and/or chills  Notify office if symptoms not improved with antibiotic         History of Present Illness   HPI    Patient presents with urinary frequency, urgency and pelvic pressure.  She is prone to getting recurrent UTIs.  She notes she was given a prophylactic antibiotic by her urologist to take after intercourse to prevent UTIs.    Review of Systems   Constitutional:  Negative for activity change, chills, diaphoresis, fatigue and fever.   HENT:  Negative for congestion, postnasal drip, rhinorrhea and sore throat.    Respiratory:  Negative for cough, shortness of breath and wheezing.    Cardiovascular:  Negative for chest pain, palpitations and leg swelling.   Gastrointestinal:  Negative for abdominal pain, constipation, diarrhea, nausea and vomiting.   Genitourinary:  Positive for dysuria, frequency, pelvic pain and urgency.   Musculoskeletal:  Negative for myalgias.   Skin:  Negative for rash.   Neurological:  Negative for weakness, light-headedness and headaches.   Psychiatric/Behavioral:  The patient is not nervous/anxious.        Objective   /88   Pulse 81   Temp 97.7 °F (36.5 °C)   Resp 16   Ht 5' 8\" (1.727 m)   Wt 89.4 kg (197 lb)   LMP 08/15/2023 (Approximate) Comment: post menapausal  SpO2 97%   BMI 29.95 kg/m²      Physical Exam  Constitutional:       Appearance: Normal appearance.   HENT:      Head: Normocephalic and atraumatic.   Abdominal:      Tenderness: " There is no right CVA tenderness or left CVA tenderness.     Neurological:      Mental Status: She is alert.     Psychiatric:         Mood and Affect: Mood normal.         Behavior: Behavior normal.         Thought Content: Thought content normal.         Judgment: Judgment normal.

## 2025-07-25 LAB — BACTERIA UR CULT: NORMAL

## 2025-07-28 ENCOUNTER — TELEPHONE (OUTPATIENT)
Dept: ADMINISTRATIVE | Facility: OTHER | Age: 50
End: 2025-07-28

## 2025-07-30 ENCOUNTER — OFFICE VISIT (OUTPATIENT)
Dept: PHYSICAL THERAPY | Facility: CLINIC | Age: 50
End: 2025-07-30
Attending: STUDENT IN AN ORGANIZED HEALTH CARE EDUCATION/TRAINING PROGRAM
Payer: COMMERCIAL

## 2025-07-30 DIAGNOSIS — N30.10 INTERSTITIAL CYSTITIS: ICD-10-CM

## 2025-07-30 DIAGNOSIS — K59.02 DYSSYNERGIC DEFECATION: Primary | ICD-10-CM

## 2025-07-30 DIAGNOSIS — K59.00 CONSTIPATION, UNSPECIFIED CONSTIPATION TYPE: ICD-10-CM

## 2025-07-30 PROCEDURE — 97112 NEUROMUSCULAR REEDUCATION: CPT

## 2025-08-05 ENCOUNTER — TELEPHONE (OUTPATIENT)
Age: 50
End: 2025-08-05

## 2025-08-05 DIAGNOSIS — M54.16 RADICULOPATHY, LUMBAR REGION: ICD-10-CM

## 2025-08-05 DIAGNOSIS — N30.00 ACUTE CYSTITIS WITHOUT HEMATURIA: Primary | ICD-10-CM

## 2025-08-05 RX ORDER — CIPROFLOXACIN 500 MG/1
500 TABLET, FILM COATED ORAL EVERY 12 HOURS SCHEDULED
Qty: 14 TABLET | Refills: 0 | Status: SHIPPED | OUTPATIENT
Start: 2025-08-05 | End: 2025-08-12

## 2025-08-05 RX ORDER — CYCLOBENZAPRINE HCL 10 MG
10 TABLET ORAL 3 TIMES DAILY PRN
Qty: 90 TABLET | Refills: 0 | Status: SHIPPED | OUTPATIENT
Start: 2025-08-05 | End: 2025-09-04

## 2025-08-13 ENCOUNTER — OFFICE VISIT (OUTPATIENT)
Dept: PHYSICAL THERAPY | Facility: CLINIC | Age: 50
End: 2025-08-13
Attending: STUDENT IN AN ORGANIZED HEALTH CARE EDUCATION/TRAINING PROGRAM
Payer: COMMERCIAL

## (undated) DEVICE — GLOVE INDICATOR PI UNDERGLOVE SZ 8 BLUE

## (undated) DEVICE — IMPLANTABLE DEVICE: Type: IMPLANTABLE DEVICE | Site: FOOT | Status: NON-FUNCTIONAL

## (undated) DEVICE — CURITY STRETCH BANDAGE: Brand: CURITY

## (undated) DEVICE — SUTURE ETHIBOND 4-0 ON PS-2

## (undated) DEVICE — SUT VICRYL 4-0 PS-2 27 IN J426H

## (undated) DEVICE — OCCLUSIVE GAUZE STRIP,3% BISMUTH TRIBROMOPHENATE IN PETROLATUM BLEND: Brand: XEROFORM

## (undated) DEVICE — SPONGE LAP 18 X 18 IN

## (undated) DEVICE — NEEDLE 18 G X 1 1/2

## (undated) DEVICE — PLUMEPEN PRO 10FT

## (undated) DEVICE — TIBURON EXTREMITY SHEET: Brand: CONVERTORS

## (undated) DEVICE — 10FR FRAZIER SUCTION HANDLE: Brand: CARDINAL HEALTH

## (undated) DEVICE — DISPOSABLE EQUIPMENT COVER: Brand: LARGE TOWEL DRAPE

## (undated) DEVICE — NEPTUNE E-SEP SMOKE EVACUATION PENCIL, COATED, 70MM BLADE, PUSH BUTTON SWITCH: Brand: NEPTUNE E-SEP

## (undated) DEVICE — 2000CC GUARDIAN II: Brand: GUARDIAN

## (undated) DEVICE — SUT ETHILON 4-0 PS-2 18 IN 1667H

## (undated) DEVICE — SUT VICRYL 3-0 PS-2 27 IN J427H

## (undated) DEVICE — ANTIBACTERIAL UNDYED BRAIDED (POLYGLACTIN 910), SYNTHETIC ABSORBABLE SUTURE: Brand: COATED VICRYL

## (undated) DEVICE — 3M™ STERI-STRIP™ REINFORCED ADHESIVE SKIN CLOSURES, R1547, 1/2 IN X 4 IN (12 MM X 100 MM), 6 STRIPS/ENVELOPE: Brand: 3M™ STERI-STRIP™

## (undated) DEVICE — BETHLEHEM UNIVERSAL  MIONR EXT: Brand: CARDINAL HEALTH

## (undated) DEVICE — PRECISION (9.0 X 0.51 X 25.0MM)

## (undated) DEVICE — NEEDLE 25G X 1 1/2

## (undated) DEVICE — GENERAL/ PLASTIC TRAY STANDARD: Brand: DEROYAL

## (undated) DEVICE — GLOVE SRG BIOGEL 7.5

## (undated) DEVICE — K-WIRE .035 4 IN
Type: IMPLANTABLE DEVICE | Site: FOOT | Status: NON-FUNCTIONAL
Removed: 2024-09-24

## (undated) DEVICE — INTENDED FOR TISSUE SEPARATION, AND OTHER PROCEDURES THAT REQUIRE A SHARP SURGICAL BLADE TO PUNCTURE OR CUT.: Brand: BARD-PARKER ® CARBON RIB-BACK BLADES

## (undated) DEVICE — SCD SEQUENTIAL COMPRESSION COMFORT SLEEVE MEDIUM KNEE LENGTH: Brand: KENDALL SCD

## (undated) DEVICE — WIRE 0.86MM  DBL ENDED TROCAR TIP
Type: IMPLANTABLE DEVICE | Site: FOOT | Status: NON-FUNCTIONAL
Removed: 2025-01-21

## (undated) DEVICE — BANDAGE, ESMARK LF STR 6"X9' (20/CS): Brand: CYPRESS

## (undated) DEVICE — CHLORAPREP HI-LITE 26ML ORANGE

## (undated) DEVICE — COBAN 4 IN STERILE

## (undated) DEVICE — CAST PADDING 4 IN SYNTHETIC NON-STRL

## (undated) DEVICE — ASTOUND STANDARD SURGICAL GOWN, XL: Brand: CONVERTORS

## (undated) DEVICE — CURITY NON-ADHERENT STRIPS: Brand: CURITY

## (undated) DEVICE — ACE WRAP 4 IN UNSTERILE

## (undated) DEVICE — SUPER SPONGES,MEDIUM: Brand: KERLIX

## (undated) DEVICE — TAPE CAST 4IN FIBERGLASS 4YD WHITE

## (undated) DEVICE — PIN HEAD BLUE FITS .035

## (undated) DEVICE — SUT VICRYL 3-0 PS-2 18 IN J497G

## (undated) DEVICE — SYRINGE 10ML LL

## (undated) DEVICE — STERILE DOUBLE BASIN SET PACK: Brand: CARDINAL HEALTH

## (undated) DEVICE — C-ARM: Brand: UNBRANDED

## (undated) DEVICE — SPONGE GAUZE 4 X 8 12 PLY STRL LF

## (undated) DEVICE — BLADE SAGITTAL 25.6 X 9.5MM

## (undated) DEVICE — DRAPE C-ARMOUR

## (undated) DEVICE — ACE WRAP 3 IN UNSTERILE

## (undated) DEVICE — CUFF TOURNIQUET 18 X 4 IN QUICK CONNECT DISP 1 BLADDER

## (undated) DEVICE — SUT ETHILON 4-0 PS-2 18 IN 1667G

## (undated) DEVICE — DRILL BIT 2MM CALIBRATED

## (undated) DEVICE — SAW BLADE 15 X 10 X 0.38MM F/SAGITTAL SAW

## (undated) DEVICE — U-DRAPE: Brand: CONVERTORS

## (undated) DEVICE — KERLIX BANDAGE ROLL: Brand: KERLIX

## (undated) DEVICE — K-WIRE .062 6 IN
Type: IMPLANTABLE DEVICE | Site: FOOT | Status: NON-FUNCTIONAL
Removed: 2024-09-24